# Patient Record
Sex: MALE | Race: BLACK OR AFRICAN AMERICAN | NOT HISPANIC OR LATINO | Employment: OTHER | URBAN - NONMETROPOLITAN AREA
[De-identification: names, ages, dates, MRNs, and addresses within clinical notes are randomized per-mention and may not be internally consistent; named-entity substitution may affect disease eponyms.]

---

## 2018-09-24 ENCOUNTER — OFFICE VISIT (OUTPATIENT)
Dept: ORTHOPEDIC SURGERY | Facility: CLINIC | Age: 73
End: 2018-09-24

## 2018-09-24 VITALS — WEIGHT: 206 LBS | BODY MASS INDEX: 28.84 KG/M2 | HEIGHT: 71 IN

## 2018-09-24 DIAGNOSIS — M25.512 CHRONIC LEFT SHOULDER PAIN: Primary | ICD-10-CM

## 2018-09-24 DIAGNOSIS — G89.29 CHRONIC LEFT SHOULDER PAIN: Primary | ICD-10-CM

## 2018-09-24 DIAGNOSIS — M75.122 COMPLETE TEAR OF LEFT ROTATOR CUFF: ICD-10-CM

## 2018-09-24 DIAGNOSIS — M25.512 LEFT SHOULDER PAIN, UNSPECIFIED CHRONICITY: Primary | ICD-10-CM

## 2018-09-24 PROCEDURE — 99203 OFFICE O/P NEW LOW 30 MIN: CPT | Performed by: ORTHOPAEDIC SURGERY

## 2018-09-24 RX ORDER — LATANOPROST 50 UG/ML
1 SOLUTION/ DROPS OPHTHALMIC NIGHTLY
COMMUNITY

## 2018-09-24 RX ORDER — LISINOPRIL 10 MG/1
40 TABLET ORAL 2 TIMES DAILY
COMMUNITY

## 2018-09-24 RX ORDER — SIMVASTATIN 10 MG
40 TABLET ORAL NIGHTLY
COMMUNITY

## 2018-09-24 RX ORDER — BUPIVACAINE HCL/0.9 % NACL/PF 0.1 %
2 PLASTIC BAG, INJECTION (ML) EPIDURAL ONCE
Status: CANCELLED | OUTPATIENT
Start: 2018-11-08 | End: 2018-11-08

## 2018-09-24 NOTE — PROGRESS NOTES
Tacos Lechuga is a 73 y.o. male   Primary provider:  Edgar Cardenas MD       Chief Complaint   Patient presents with   • Left Shoulder - Pain       HISTORY OF PRESENT ILLNESS: Patient is here today for left shoulder pain. Patient states that the pain has been ongoing for 7 years. He states his pain is 8/10.  He had a problem years ago that seemed to get better but is been going on now for the past 7 months and increasing in pain.  It's worse with activity better with rest it hurts when lying on it at night.  [facility] that revealed a full-thickness tear of his supraspinatus.  He is here for follow-up.    History of Present Illness     CONCURRENT MEDICAL HISTORY:    No past medical history on file.    No Known Allergies      Current Outpatient Prescriptions:   •  latanoprost (XALATAN) 0.005 % ophthalmic solution, 1 drop Every Night., Disp: , Rfl:   •  lisinopril (PRINIVIL,ZESTRIL) 10 MG tablet, Take 10 mg by mouth Daily., Disp: , Rfl:   •  simvastatin (ZOCOR) 10 MG tablet, Take 10 mg by mouth., Disp: , Rfl:     No past surgical history on file.    No family history on file.     Social History     Social History   • Marital status:      Spouse name: N/A   • Number of children: N/A   • Years of education: N/A     Occupational History   • Not on file.     Social History Main Topics   • Smoking status: Not on file   • Smokeless tobacco: Not on file   • Alcohol use Not on file   • Drug use: Unknown   • Sexual activity: Not on file     Other Topics Concern   • Not on file     Social History Narrative   • No narrative on file        Review of Systems   Constitutional: Positive for activity change. Negative for chills and fever.   HENT: Negative for facial swelling.    Eyes: Negative for photophobia.   Respiratory: Negative for apnea and shortness of breath.    Cardiovascular: Negative for chest pain and leg swelling.   Gastrointestinal: Negative for abdominal pain,  "nausea and vomiting.   Genitourinary: Negative for dysuria.   Musculoskeletal: Positive for arthralgias and myalgias.   Skin: Negative for color change and rash.   Neurological: Negative for seizures and syncope.   Psychiatric/Behavioral: Negative for behavioral problems and dysphoric mood.       PHYSICAL EXAMINATION:       Ht 180.3 cm (71\")   Wt 93.4 kg (206 lb)   BMI 28.73 kg/m²     Physical Exam   Constitutional: He is oriented to person, place, and time. He appears well-developed and well-nourished.   HENT:   Head: Normocephalic and atraumatic.   Eyes: Pupils are equal, round, and reactive to light. EOM are normal.   Neck: Neck supple. No tracheal deviation present.   Pulmonary/Chest: Effort normal.   Musculoskeletal: He exhibits tenderness. He exhibits no edema or deformity.   Neurological: He is alert and oriented to person, place, and time.   Skin: Skin is warm and dry. No erythema.   Psychiatric: He has a normal mood and affect.   Vitals reviewed.      GAIT:     [x]  Normal  []  Antalgic    Assistive device: [x]  None  []  Walker     []  Crutches  []  Cane     []  Wheelchair  []  Stretcher    Ortho Exam   Substances to external rotation and abduction of the left shoulder.  No tenderness over the AC joint.  Positive impingement with lateral pain resistance of the supraspinatus.  Weakness is noted here as well.  Neurovascularly intact distally.    No results found.  Trays and MRI reviewed x-rays show AC joint arthritis MRI scan showed an anterior full-thickness supraspinatus tear.      ASSESSMENT:    Diagnoses and all orders for this visit:    Chronic left shoulder pain    Complete tear of left rotator cuff    Other orders  -     simvastatin (ZOCOR) 10 MG tablet; Take 10 mg by mouth.  -     lisinopril (PRINIVIL,ZESTRIL) 10 MG tablet; Take 10 mg by mouth Daily.  -     latanoprost (XALATAN) 0.005 % ophthalmic solution; 1 drop Every Night.          PLAN I've gone over options with him.  He has a daily pain and " limiting his activities.  We'll plan on arthroscopic repair and subacromial decompression.  Risks and benefits of continued stiffness, need for prolonged therapy, recurrent tear, neurovascular injury, failure to resolve his pain, need for further surgery, neurovascular injury, medical anesthetic, locations, etc. this is all been discussed and understands we'll go and proceed as planned I've answered her questions today.    No Follow-up on file.    Renard Jacobson MD

## 2018-11-02 ENCOUNTER — APPOINTMENT (OUTPATIENT)
Dept: PREADMISSION TESTING | Facility: HOSPITAL | Age: 73
End: 2018-11-02

## 2018-11-02 VITALS
RESPIRATION RATE: 14 BRPM | HEART RATE: 73 BPM | DIASTOLIC BLOOD PRESSURE: 84 MMHG | BODY MASS INDEX: 29.12 KG/M2 | WEIGHT: 208 LBS | SYSTOLIC BLOOD PRESSURE: 142 MMHG | HEIGHT: 71 IN

## 2018-11-02 PROCEDURE — 93005 ELECTROCARDIOGRAM TRACING: CPT

## 2018-11-02 PROCEDURE — 93010 ELECTROCARDIOGRAM REPORT: CPT | Performed by: INTERNAL MEDICINE

## 2018-11-02 RX ORDER — SODIUM CHLORIDE, SODIUM GLUCONATE, SODIUM ACETATE, POTASSIUM CHLORIDE, AND MAGNESIUM CHLORIDE 526; 502; 368; 37; 30 MG/100ML; MG/100ML; MG/100ML; MG/100ML; MG/100ML
1000 INJECTION, SOLUTION INTRAVENOUS CONTINUOUS
Status: CANCELLED | OUTPATIENT
Start: 2018-11-08

## 2018-11-02 RX ORDER — CHOLECALCIFEROL (VITAMIN D3) 50 MCG
2000 TABLET ORAL DAILY
COMMUNITY

## 2018-11-02 NOTE — PAT
Called Dr. Tijerina read today EKG over the phone, patient denies chest pain and relates he can climb 2 flights of stairs without getting soa.  No further orders may proceed with surgery.    Chlorhexidine given with written and verbal instructions, all questions were answered to patient satisfaction.    Opioid pain medication pamphlet given.

## 2018-11-02 NOTE — DISCHARGE INSTRUCTIONS
[facility]  Pre-op Information and Guidelines    You will be called after 2 p.m. the day before your surgery (Friday for Monday surgery) and notified of your time for arrival and approximate surgery time.  If you have not received a call by 4P.M., please contact Same Day Surgery at [phone] of if outside [facility] call [phone].    Please Follow these Important Safety Guidelines:    • The morning of your procedure, take only the medications listed below with   A sip of water:_____________________________________________       ______________________________________________    • DO NOT eat or drink anything after 12:00 midnight the night before surgery  Specific instructions concerning drinking clear liquids will be discussed during  the pre-surgery instruction call the day before your surgery.    • If you take a blood thinner (ex. Plavix, Coumadin, aspirin), ask your doctor when to stop it before surgery  STOP DATE: _________________    • Only 2 visitors are allowed in patient rooms at a time  Your visitors will be asked to wait in the lobby until the admission process is complete with the exception of a parent with a child and patients in need of special assistance.    • YOU CANNOT DRIVE YOURSELF HOME  You must be accompanied by someone who will be responsible for driving you home after surgery and for your care at home.    • DO NOT chew gum, use breath mints, hard candy, or smoke the day of surgery  • DO NOT drink alcohol for at least 24 hours before your surgery  • DO NOT wear any jewelry and remove all body piercing before coming to the hospital  • DO NOT wear make-up to the hospital  • If you are having surgery on an extremity (arm/leg/foot) remove nail polish/artificial nails on the surgical side  • Clothing, glasses, contacts, dentures, and hairpieces must be removed before surgery  • Bathe the night before or the morning of your surgery and do not use powders/lotions on  skin.

## 2018-11-07 ENCOUNTER — ANESTHESIA EVENT (OUTPATIENT)
Dept: PERIOP | Facility: HOSPITAL | Age: 73
End: 2018-11-07

## 2018-11-08 ENCOUNTER — HOSPITAL ENCOUNTER (OUTPATIENT)
Facility: HOSPITAL | Age: 73
Setting detail: HOSPITAL OUTPATIENT SURGERY
Discharge: HOME | End: 2018-11-08
Attending: ORTHOPAEDIC SURGERY | Admitting: ORTHOPAEDIC SURGERY

## 2018-11-08 ENCOUNTER — ANESTHESIA (OUTPATIENT)
Dept: PERIOP | Facility: HOSPITAL | Age: 73
End: 2018-11-08

## 2018-11-08 VITALS
RESPIRATION RATE: 20 BRPM | HEART RATE: 60 BPM | DIASTOLIC BLOOD PRESSURE: 80 MMHG | OXYGEN SATURATION: 99 % | SYSTOLIC BLOOD PRESSURE: 143 MMHG | HEIGHT: 71 IN | WEIGHT: 205.25 LBS | BODY MASS INDEX: 28.73 KG/M2 | TEMPERATURE: 97.6 F

## 2018-11-08 DIAGNOSIS — M75.122 COMPLETE TEAR OF LEFT ROTATOR CUFF: ICD-10-CM

## 2018-11-08 PROCEDURE — 25010000002 PROPOFOL 10 MG/ML EMULSION: Performed by: NURSE ANESTHETIST, CERTIFIED REGISTERED

## 2018-11-08 PROCEDURE — 29826 SHO ARTHRS SRG DECOMPRESSION: CPT | Performed by: ORTHOPAEDIC SURGERY

## 2018-11-08 PROCEDURE — 25010000002 EPINEPHRINE PER 0.1 MG: Performed by: ORTHOPAEDIC SURGERY

## 2018-11-08 PROCEDURE — 25010000002 NEOSTIGMINE 4 MG/4ML SOLUTION PREFILLED SYRINGE: Performed by: NURSE ANESTHETIST, CERTIFIED REGISTERED

## 2018-11-08 PROCEDURE — 25010000002 FENTANYL CITRATE (PF) 100 MCG/2ML SOLUTION: Performed by: NURSE ANESTHETIST, CERTIFIED REGISTERED

## 2018-11-08 PROCEDURE — 29827 SHO ARTHRS SRG RT8TR CUF RPR: CPT | Performed by: ORTHOPAEDIC SURGERY

## 2018-11-08 PROCEDURE — 25010000002 MIDAZOLAM PER 1 MG: Performed by: NURSE ANESTHETIST, CERTIFIED REGISTERED

## 2018-11-08 PROCEDURE — 25010000002 PHENYLEPHRINE PER 1 ML: Performed by: NURSE ANESTHETIST, CERTIFIED REGISTERED

## 2018-11-08 PROCEDURE — 25010000002 ONDANSETRON PER 1 MG: Performed by: NURSE ANESTHETIST, CERTIFIED REGISTERED

## 2018-11-08 PROCEDURE — C1713 ANCHOR/SCREW BN/BN,TIS/BN: HCPCS | Performed by: ORTHOPAEDIC SURGERY

## 2018-11-08 DEVICE — SUT/ANCH BIOCOMP SWIVELOCK/C DBL 4.75X22MM: Type: IMPLANTABLE DEVICE | Site: SHOULDER | Status: FUNCTIONAL

## 2018-11-08 RX ORDER — NEOSTIGMINE METHYLSULFATE 4 MG/4 ML
SYRINGE (ML) INTRAVENOUS AS NEEDED
Status: DISCONTINUED | OUTPATIENT
Start: 2018-11-08 | End: 2018-11-08 | Stop reason: SURG

## 2018-11-08 RX ORDER — ONDANSETRON 2 MG/ML
INJECTION INTRAMUSCULAR; INTRAVENOUS AS NEEDED
Status: DISCONTINUED | OUTPATIENT
Start: 2018-11-08 | End: 2018-11-08 | Stop reason: SURG

## 2018-11-08 RX ORDER — OXYCODONE HYDROCHLORIDE AND ACETAMINOPHEN 5; 325 MG/1; MG/1
1-2 TABLET ORAL EVERY 4 HOURS PRN
Qty: 30 TABLET | Refills: 0 | Status: SHIPPED | OUTPATIENT
Start: 2018-11-08 | End: 2019-01-10

## 2018-11-08 RX ORDER — SODIUM CHLORIDE, SODIUM GLUCONATE, SODIUM ACETATE, POTASSIUM CHLORIDE, AND MAGNESIUM CHLORIDE 526; 502; 368; 37; 30 MG/100ML; MG/100ML; MG/100ML; MG/100ML; MG/100ML
1000 INJECTION, SOLUTION INTRAVENOUS CONTINUOUS
Status: DISCONTINUED | OUTPATIENT
Start: 2018-11-08 | End: 2018-11-08 | Stop reason: HOSPADM

## 2018-11-08 RX ORDER — ROCURONIUM BROMIDE 10 MG/ML
INJECTION, SOLUTION INTRAVENOUS AS NEEDED
Status: DISCONTINUED | OUTPATIENT
Start: 2018-11-08 | End: 2018-11-08 | Stop reason: SURG

## 2018-11-08 RX ORDER — ONDANSETRON 2 MG/ML
4 INJECTION INTRAMUSCULAR; INTRAVENOUS ONCE AS NEEDED
Status: DISCONTINUED | OUTPATIENT
Start: 2018-11-08 | End: 2018-11-08 | Stop reason: HOSPADM

## 2018-11-08 RX ORDER — MEPERIDINE HYDROCHLORIDE 50 MG/ML
12.5 INJECTION INTRAMUSCULAR; INTRAVENOUS; SUBCUTANEOUS
Status: DISCONTINUED | OUTPATIENT
Start: 2018-11-08 | End: 2018-11-08 | Stop reason: HOSPADM

## 2018-11-08 RX ORDER — LIDOCAINE HYDROCHLORIDE 20 MG/ML
INJECTION, SOLUTION INFILTRATION; PERINEURAL AS NEEDED
Status: DISCONTINUED | OUTPATIENT
Start: 2018-11-08 | End: 2018-11-08 | Stop reason: SURG

## 2018-11-08 RX ORDER — FENTANYL CITRATE 50 UG/ML
INJECTION, SOLUTION INTRAMUSCULAR; INTRAVENOUS AS NEEDED
Status: DISCONTINUED | OUTPATIENT
Start: 2018-11-08 | End: 2018-11-08 | Stop reason: SURG

## 2018-11-08 RX ORDER — EPHEDRINE SULFATE 50 MG/ML
INJECTION, SOLUTION INTRAVENOUS AS NEEDED
Status: DISCONTINUED | OUTPATIENT
Start: 2018-11-08 | End: 2018-11-08 | Stop reason: SURG

## 2018-11-08 RX ORDER — ALBUTEROL SULFATE 2.5 MG/3ML
2.5 SOLUTION RESPIRATORY (INHALATION) ONCE AS NEEDED
Status: DISCONTINUED | OUTPATIENT
Start: 2018-11-08 | End: 2018-11-08 | Stop reason: HOSPADM

## 2018-11-08 RX ORDER — BUPIVACAINE HCL/0.9 % NACL/PF 0.1 %
2 PLASTIC BAG, INJECTION (ML) EPIDURAL ONCE
Status: COMPLETED | OUTPATIENT
Start: 2018-11-08 | End: 2018-11-08

## 2018-11-08 RX ORDER — GLYCOPYRROLATE 0.2 MG/ML
INJECTION INTRAMUSCULAR; INTRAVENOUS AS NEEDED
Status: DISCONTINUED | OUTPATIENT
Start: 2018-11-08 | End: 2018-11-08 | Stop reason: SURG

## 2018-11-08 RX ORDER — MIDAZOLAM HYDROCHLORIDE 1 MG/ML
INJECTION INTRAMUSCULAR; INTRAVENOUS AS NEEDED
Status: DISCONTINUED | OUTPATIENT
Start: 2018-11-08 | End: 2018-11-08 | Stop reason: SURG

## 2018-11-08 RX ORDER — PROPOFOL 10 MG/ML
VIAL (ML) INTRAVENOUS AS NEEDED
Status: DISCONTINUED | OUTPATIENT
Start: 2018-11-08 | End: 2018-11-08 | Stop reason: SURG

## 2018-11-08 RX ORDER — EPINEPHRINE 1 MG/ML
INJECTION, SOLUTION, CONCENTRATE INTRAVENOUS AS NEEDED
Status: DISCONTINUED | OUTPATIENT
Start: 2018-11-08 | End: 2018-11-08 | Stop reason: HOSPADM

## 2018-11-08 RX ADMIN — PHENYLEPHRINE HYDROCHLORIDE 50 MCG: 10 INJECTION INTRAVENOUS at 12:58

## 2018-11-08 RX ADMIN — ROCURONIUM BROMIDE 35 MG: 10 INJECTION INTRAVENOUS at 12:36

## 2018-11-08 RX ADMIN — ONDANSETRON 4 MG: 2 INJECTION INTRAMUSCULAR; INTRAVENOUS at 14:04

## 2018-11-08 RX ADMIN — SODIUM CHLORIDE, SODIUM GLUCONATE, SODIUM ACETATE, POTASSIUM CHLORIDE, AND MAGNESIUM CHLORIDE: 526; 502; 368; 37; 30 INJECTION, SOLUTION INTRAVENOUS at 13:00

## 2018-11-08 RX ADMIN — LIDOCAINE HYDROCHLORIDE 50 MG: 20 INJECTION, SOLUTION INFILTRATION; PERINEURAL at 12:36

## 2018-11-08 RX ADMIN — SODIUM CHLORIDE, SODIUM GLUCONATE, SODIUM ACETATE, POTASSIUM CHLORIDE, AND MAGNESIUM CHLORIDE 1000 ML: 526; 502; 368; 37; 30 INJECTION, SOLUTION INTRAVENOUS at 10:21

## 2018-11-08 RX ADMIN — PHENYLEPHRINE HYDROCHLORIDE 50 MCG: 10 INJECTION INTRAVENOUS at 13:04

## 2018-11-08 RX ADMIN — PROPOFOL 50 MG: 10 INJECTION, EMULSION INTRAVENOUS at 14:01

## 2018-11-08 RX ADMIN — EPHEDRINE SULFATE 10 MG: 50 INJECTION INTRAVENOUS at 13:07

## 2018-11-08 RX ADMIN — EPHEDRINE SULFATE 10 MG: 50 INJECTION INTRAVENOUS at 13:11

## 2018-11-08 RX ADMIN — Medication 2 G: at 12:44

## 2018-11-08 RX ADMIN — PROPOFOL 100 MG: 10 INJECTION, EMULSION INTRAVENOUS at 12:36

## 2018-11-08 RX ADMIN — MIDAZOLAM HYDROCHLORIDE 2 MG: 2 INJECTION, SOLUTION INTRAMUSCULAR; INTRAVENOUS at 12:14

## 2018-11-08 RX ADMIN — SODIUM CHLORIDE, SODIUM GLUCONATE, SODIUM ACETATE, POTASSIUM CHLORIDE, AND MAGNESIUM CHLORIDE: 526; 502; 368; 37; 30 INJECTION, SOLUTION INTRAVENOUS at 13:01

## 2018-11-08 RX ADMIN — GLYCOPYRROLATE 0.4 MCG: 0.2 INJECTION, SOLUTION INTRAMUSCULAR; INTRAVENOUS at 14:18

## 2018-11-08 RX ADMIN — FENTANYL CITRATE 50 MCG: 50 INJECTION, SOLUTION INTRAMUSCULAR; INTRAVENOUS at 14:01

## 2018-11-08 RX ADMIN — Medication 2 MG: at 14:19

## 2018-11-08 RX ADMIN — FENTANYL CITRATE 50 MCG: 50 INJECTION, SOLUTION INTRAMUSCULAR; INTRAVENOUS at 12:36

## 2018-11-08 NOTE — ANESTHESIA PROCEDURE NOTES
Airway  Urgency: elective    Date/Time: 11/8/2018 12:46 PM  End Time:11/8/2018 12:47 PM    General Information and Staff    Patient location during procedure: OR    Indications and Patient Condition  Indications for airway management: airway protection    Preoxygenated: yes  MILS not maintained throughout  Mask difficulty assessment: 1 - vent by mask    Final Airway Details  Final airway type: endotracheal airway      Successful airway: ETT  Cuffed: yes   Successful intubation technique: direct laryngoscopy  Facilitating devices/methods: intubating stylet  Endotracheal tube insertion site: oral  Blade: Arin  Blade size: 3  ETT size: 7.5 mm  Cormack-Lehane Classification: grade I - full view of glottis  Placement verified by: chest auscultation and capnometry   Measured from: lips  ETT to lips (cm): 22

## 2018-11-08 NOTE — H&P
Patient Care Team:  Edgar Cardenas MD as PCP - General (Family Medicine)  Renard Jacobson MD as Consulting Physician (Orthopedic Surgery)    Chief complaint shoulder pain    Subjective     73-year-old with shoulder pain been going on for years.  He had a MRI to be a short anterior supraspinatus tear was full-thickness see if her cuff repair at this point this is an interval H&P since we have seen him greater than 30 days ago.        Review of Systems   Constitutional: Positive for activity change. Negative for chills and fever.   HENT: Negative for facial swelling.    Eyes: Negative for photophobia.   Respiratory: Negative for apnea and shortness of breath.    Cardiovascular: Negative for chest pain and leg swelling.   Gastrointestinal: Negative for abdominal pain, nausea and vomiting.   Genitourinary: Negative for dysuria.   Musculoskeletal: Positive for arthralgias and joint swelling.   Skin: Negative for color change and rash.   Neurological: Negative for seizures and syncope.   Psychiatric/Behavioral: Negative for behavioral problems and dysphoric mood.        Past Medical History:   Diagnosis Date   • Arthritis    • Elevated cholesterol    • Hypertension    • Left hip pain    • Pain in shoulder     both shoulders   • Wears glasses    • Wears partial dentures        Objective      Vital Signs  Temp:  [97.7 °F (36.5 °C)] 97.7 °F (36.5 °C)  Heart Rate:  [81] 81  Resp:  [18] 18  BP: (141)/(75) 141/75  No Known Allergies   Past Surgical History:   Procedure Laterality Date   • FINGER SURGERY Right     middle   • FRACTURE SURGERY      left arm     No current facility-administered medications on file prior to encounter.      Current Outpatient Prescriptions on File Prior to Encounter   Medication Sig Dispense Refill   • latanoprost (XALATAN) 0.005 % ophthalmic solution Administer 1 drop to both eyes Every Night.     • lisinopril (PRINIVIL,ZESTRIL) 10 MG tablet Take 40 mg by mouth 2 (Two) Times a Day.      • simvastatin (ZOCOR) 10 MG tablet Take 40 mg by mouth Every Night. Takes 0.5 tablet       Social History     Social History   • Marital status:      Spouse name: N/A   • Number of children: N/A   • Years of education: N/A     Occupational History   • Not on file.     Social History Main Topics   • Smoking status: Former Smoker   • Smokeless tobacco: Never Used   • Alcohol use Yes      Comment: yearly   • Drug use: No   • Sexual activity: Not on file     Other Topics Concern   • Not on file     Social History Narrative   • No narrative on file     History reviewed. No pertinent family history.    Physical Exam   Constitutional: He is oriented to person, place, and time. He appears well-developed and well-nourished.   HENT:   Head: Normocephalic and atraumatic.   Eyes: Pupils are equal, round, and reactive to light. EOM are normal.   Neck: Neck supple. No tracheal deviation present.   Pulmonary/Chest: Effort normal.   Musculoskeletal: He exhibits tenderness. He exhibits no edema or deformity.   Neurological: He is alert and oriented to person, place, and time.   Skin: Skin is warm and dry. No erythema.   Psychiatric: He has a normal mood and affect.   Vitals reviewed.      Results Review:   I reviewed the patient's new clinical results.      Assessment/Plan       Complete tear of left rotator cuff      Assessment & Plan    I discussed the patients findings and my recommendations with patient    Renard Jacobson MD  11/08/18  11:55 AM    Time: More than 50% of time spent in counseling and coordination of care:  Total face-to-face/floor time 20 min.  Time spent in counseling 20 min. Counseling included the following topics: Benefits explained to him again including but not limited to bleeding, blood clot, need for further surgery, failure to resolve his pain, recurrence of tear, infection, medical anesthetic complications, etc. he understands detailed informed consent is given we will proceed as planned

## 2018-11-08 NOTE — ANESTHESIA PREPROCEDURE EVALUATION
Anesthesia Evaluation     Patient summary reviewed and Nursing notes reviewed   no history of anesthetic complications:  NPO Solid Status: > 8 hours  NPO Liquid Status: > 8 hours           Airway   Mallampati: II  TM distance: >3 FB  Neck ROM: full  possible difficult intubation  Dental    (+) poor dentation    Pulmonary - normal exam    breath sounds clear to auscultation  (+) a smoker Former,   Cardiovascular - normal exam    ECG reviewed  Rhythm: regular  Rate: normal    (+) hypertension, hyperlipidemia,   (-) murmur, carotid bruits    ROS comment: Normal sinus rhythm  Right bundle branch block  Abnormal ECG  No previous ECGs available  Confirmed by DANAE CHUA MD ([id number]) on 11/3/2018 1:40:19 PM    Neuro/Psych- negative ROS  GI/Hepatic/Renal/Endo - negative ROS     Musculoskeletal     Abdominal    Substance History - negative use     OB/GYN negative ob/gyn ROS         Other   (+) arthritis                     Anesthesia Plan    ASA 3     general   (Discussed peripheral nerve block(interscalene) for post op pain relief and patient understands possible complications,risks and agrees.)  intravenous induction   Anesthetic plan, all risks, benefits, and alternatives have been provided, discussed and informed consent has been obtained with: patient and spouse/significant other.

## 2018-11-08 NOTE — ANESTHESIA PROCEDURE NOTES
Peripheral Block      Start time: 11/8/2018 12:20 PM  Stop time: 11/8/2018 12:30 PM  Performed by  CRNA: ÁLVARO ALVARENGA  Assisted by: PANTERA PARKER  Preanesthetic Checklist  Completed: patient identified, site marked, surgical consent, pre-op evaluation, timeout performed, IV checked, risks and benefits discussed and monitors and equipment checked  Prep:  Pt Position: sitting  Sterile barriers:cap, gloves, gown and mask  Prep: ChloraPrep  Patient monitoring: blood pressure monitoring, continuous pulse oximetry and EKG  Procedure  Sedation:yes  Performed under: MAC  Guidance:ultrasound guided and landmark technique  Images:still images obtained    Laterality:left  Block Type:interscalene  Injection Technique:single-shot  Needle Type:echogenic and short-bevel  Needle Gauge:21 G    Medications  Comment:1cc 10mg Decadron  Local Injected:ropivacaine 0.5% Local Amount Injected:29mL  Post Assessment  Injection Assessment: negative aspiration for heme, no paresthesia on injection and incremental injection  Patient Tolerance:comfortable throughout block  Complications:no

## 2018-11-08 NOTE — ANESTHESIA POSTPROCEDURE EVALUATION
Patient: Tacos Ha Ankush    Procedure Summary     Date:  11/08/18 Room / Location:  [facility] OR [address] [place] [address] OR    Anesthesia Start:  1215 Anesthesia Stop:  1437    Procedure:  ARTHROSCOPIC SUBACROMIAL DECOMPRESSION  WITH ROTATOR CUFF REPAIR (Left Shoulder) Diagnosis:       Complete tear of left rotator cuff      (Complete tear of left rotator cuff [M75.122])    Surgeon:  Renard Jacobson MD Provider:  Angel Tijerina MD    Anesthesia Type:  general ASA Status:  3          Anesthesia Type: general  Last vitals  BP   141/75 (11/08/18 1013)   Temp   97.7 °F (36.5 °C) (11/08/18 1013)   Pulse   81 (11/08/18 1013)   Resp   18 (11/08/18 1013)     SpO2   97 % (11/08/18 1013)     Post Anesthesia Care and Evaluation    Patient location during evaluation: bedside  Patient participation: complete - patient participated  Level of consciousness: awake  Pain score: 0  Pain management: adequate  Airway patency: patent  Anesthetic complications: No anesthetic complications  PONV Status: none  Cardiovascular status: acceptable  Respiratory status: acceptable  Hydration status: acceptable

## 2018-11-09 NOTE — OP NOTE
Procedure(s):  ARTHROSCOPIC SUBACROMIAL DECOMPRESSION  WITH ROTATOR CUFF REPAIR  Procedure Note    Tacos Ha Ankush  11/8/2018    Pre-op Diagnosis:   Complete tear of left rotator cuff [M75.122]    Post-op Diagnosis:     Post-Op Diagnosis Codes:     * Complete tear of left rotator cuff [M75.122]    Procedure/CPT® Codes:      Procedure(s):  1.ARTHROSCOPIC SUBACROMIAL DECOMPRESSION LEFT  2. ARTHROSCOPIC ROTATOR CUFF REPAIR LEFT  3.  Manipulation under anesthesia left shoulder  Surgeon(s):  Renard Jacobson MD    Anesthesia: General with Block    Staff:   Circulator: Raheem Caraballo RN; Gwendolyn Jacobs RN  Scrub Person: Justa Angelo; Jordyn Vigil CST  Assistant: Carlita Ceja MA  Other: Vikas Chaves RN    Estimated Blood Loss: minimal    Specimens:                None      Drains:      Findings: Rotator cuff tear/adhesive capsulitis    Complications: None    Dictation: Indications:73-year-old gentleman with radiographic and clinical evidence of rotator cuff tear who has failed conservative treatment including injections, therapy, activity modifications, medications, etc.  He has persistent pain he is rotator cuff repair and surgery is indicated today.  Risk and benefits of stiffness, need for further surgery, recurrence, neurovascular injury, infection, need for medical anesthetic complications, etc. this is all been discussed he understands we will proceed as planned.    Procedure: After adequate general anesthesia obtained patient's shoulder prepped and draped usual sterile fashion , placed in beachchair position, timeout was performed.    The shoulder was examined and noted to be stiff and abduction external rotation and manipulation was performed which he restored his motion.    Posterior portal was encountered the glenohumeral joint was inspected certainly was very tight marked inflammation was noted in the shoulder joint.  The glenohumeral joint itself looked intact labrum was  intact the biceps tendon was intact marked inflammatory synovitis was noted.  Pictures were taken of this joint and kept for the record's were used to the entire case.  The cuff was noted have a small internal tear at the anterior supraspinatus again biceps was intact labrum was intact.  No loose bodies noted.    Attention was then paid to subacromial area thickened bursa was resected and 8 and visualization the ligamentous structures were taken off the anterior acromion for a fairly significant spur was encountered.  He did not have preoperative AC joint tenderness therefore it was not resected.  A bur was used to perform acromioplasty creating a type I acromion from a type I-type II using the bur a cutting block technique to ensure we had completed adequate decompression.  At this point a tear was encountered fairly thin tissue as expected this was likely just over a centimeter.  The edges of the cuff were torn were shaved back to good healthy tissue bur was use to create a good bleeding bony footprint.  This was just adjacent to the biceps tendon.    The arthroscopic repair take then placed in a mattress fashion and a suture anchors placed laterally in the greater tuberosity through small hole.  The cuff is tightened down and closed over the bleeding bony footprint and pictures were taken of this as well the anchor was deployed and secured excess sutures removed.  Pictures were taken of this satisfied and happy with our repair.  There is irrigated) saline as was withdrawn portals closed with 4-0 nylon suture soled immobilizer applied he tolerated the procedure well.    Renard Jacobson MD  11/08/18  8:29 PM

## 2018-11-15 ENCOUNTER — OFFICE VISIT (OUTPATIENT)
Dept: ORTHOPEDIC SURGERY | Facility: CLINIC | Age: 73
End: 2018-11-15

## 2018-11-15 VITALS — BODY MASS INDEX: 28.7 KG/M2 | HEIGHT: 71 IN | WEIGHT: 205 LBS

## 2018-11-15 DIAGNOSIS — M75.122 COMPLETE TEAR OF LEFT ROTATOR CUFF: Primary | ICD-10-CM

## 2018-11-15 PROCEDURE — 99024 POSTOP FOLLOW-UP VISIT: CPT | Performed by: ORTHOPAEDIC SURGERY

## 2018-11-15 NOTE — PROGRESS NOTES
Tacos Lechuga is a 73 y.o. male is s/p       Chief Complaint   Patient presents with   • Post-op     Left shoulder       HISTORY OF PRESENT ILLNESS: Patient is here today s/p arthroscopic SAD with rotator cuff repair of the left shoulder. Patient states that he is not having any pain today.        No Known Allergies      Current Outpatient Medications:   •  Cholecalciferol (VITAMIN D) 2000 units tablet, Take 2,000 Units by mouth Daily., Disp: , Rfl:   •  latanoprost (XALATAN) 0.005 % ophthalmic solution, Administer 1 drop to both eyes Every Night., Disp: , Rfl:   •  lisinopril (PRINIVIL,ZESTRIL) 10 MG tablet, Take 40 mg by mouth 2 (Two) Times a Day., Disp: , Rfl:   •  Multiple Vitamins-Minerals (MULTIVITAMIN ADULT PO), Take 1 tablet by mouth Daily., Disp: , Rfl:   •  oxyCODONE-acetaminophen (PERCOCET) 5-325 MG per tablet, Take 1-2 tablets by mouth Every 4 (Four) Hours As Needed (Pain)., Disp: 30 tablet, Rfl: 0  •  simvastatin (ZOCOR) 10 MG tablet, Take 40 mg by mouth Every Night. Takes 0.5 tablet, Disp: , Rfl:     No fevers or chills.  No nausea or vomiting.      PHYSICAL EXAMINATION:       Tacos Lechuga is a 73 y.o. male    Patient is awake and alert, answers questions appropriately and is in no apparent distress.    GAIT:     []  Normal  []  Antalgic    Assistive device: []  None  []  Walker     []  Crutches  []  Cane     []  Wheelchair  []  Stretcher    Ortho Exam  Wounds look good.  No sign of infection.  Neurovascularly intact.  He is moving his arm probably little more than he should.    No results found.        ASSESSMENT:    Diagnoses and all orders for this visit:    Complete tear of left rotator cuff  -     Ambulatory Referral to Physical Therapy          PLAN doing very well at this point.  Suture removal.  I've gone over his arthroscopic pictures with him and the importance of passive range of motion.  He did have some stiffness, adhesive capsulitis and surgery will start therapy check  him in 3 weeks.  Call sooner with any problems.  He is to continue his sling    No Follow-up on file.    Renard Jacobson MD

## 2018-12-06 ENCOUNTER — HOSPITAL ENCOUNTER (OUTPATIENT)
Dept: PHYSICAL THERAPY | Facility: HOSPITAL | Age: 73
Setting detail: THERAPIES SERIES
Discharge: HOME | End: 2018-12-06

## 2018-12-06 ENCOUNTER — OFFICE VISIT (OUTPATIENT)
Dept: ORTHOPEDIC SURGERY | Facility: CLINIC | Age: 73
End: 2018-12-06

## 2018-12-06 VITALS — BODY MASS INDEX: 30.1 KG/M2 | HEIGHT: 71 IN | WEIGHT: 215 LBS

## 2018-12-06 DIAGNOSIS — M25.512 LEFT SHOULDER PAIN, UNSPECIFIED CHRONICITY: ICD-10-CM

## 2018-12-06 DIAGNOSIS — M25.512 ACUTE PAIN OF LEFT SHOULDER: Primary | ICD-10-CM

## 2018-12-06 DIAGNOSIS — G89.29 CHRONIC LEFT SHOULDER PAIN: ICD-10-CM

## 2018-12-06 DIAGNOSIS — M25.512 CHRONIC LEFT SHOULDER PAIN: ICD-10-CM

## 2018-12-06 DIAGNOSIS — M75.122 COMPLETE TEAR OF LEFT ROTATOR CUFF: ICD-10-CM

## 2018-12-06 DIAGNOSIS — M75.122 COMPLETE TEAR OF LEFT ROTATOR CUFF: Primary | ICD-10-CM

## 2018-12-06 PROCEDURE — G8985 CARRY GOAL STATUS: HCPCS | Performed by: PHYSICAL THERAPIST

## 2018-12-06 PROCEDURE — G8984 CARRY CURRENT STATUS: HCPCS | Performed by: PHYSICAL THERAPIST

## 2018-12-06 PROCEDURE — 97110 THERAPEUTIC EXERCISES: CPT | Performed by: PHYSICAL THERAPIST

## 2018-12-06 PROCEDURE — 99024 POSTOP FOLLOW-UP VISIT: CPT | Performed by: ORTHOPAEDIC SURGERY

## 2018-12-06 PROCEDURE — 97161 PT EVAL LOW COMPLEX 20 MIN: CPT | Performed by: PHYSICAL THERAPIST

## 2018-12-06 NOTE — PROGRESS NOTES
"Tacos Lechuga is a 73 y.o. male returns for     Chief Complaint   Patient presents with   • Left Shoulder - Post-op     11/8/18  Surgeon Role   Renard Jacobson MD Primary    Procedure Laterality Anesthesia   ARTHROSCOPIC SUBACROMIAL DECOMPRESSION  WITH ROTATOR CUFF REPAIR Left General with Block          HISTORY OF PRESENT ILLNESS: Patient being seen for left shoulder follow up.  Is been 3 weeks since we've seen him he has not had physical therapy yet although his wife has been trying very hard to get this arranged.       CONCURRENT MEDICAL HISTORY:    The following portions of the patient's history were reviewed and updated as appropriate: allergies, current medications, past family history, past medical history, past social history, past surgical history and problem list.     ROS  No fevers or chills.  No chest pain or shortness of air.  No GI or  disturbances.    PHYSICAL EXAMINATION:       Ht 180.3 cm (71\")   Wt 97.5 kg (215 lb)   BMI 29.99 kg/m²     Physical Exam    GAIT:     [x]  Normal  []  Antalgic    Assistive device: [x]  None  []  Walker     []  Crutches  []  Cane     []  Wheelchair  []  Stretcher    Ortho Exam  Wounds look good.  Stiff in all parameters probably 20° of external rotation 50° of abduction 70° of forward flexion and he is neurovascularly intact               ASSESSMENT:    Diagnoses and all orders for this visit:    Complete tear of left rotator cuff    Left shoulder pain, unspecified chronicity    Chronic left shoulder pain          PLAN I discussed this with the preserve person as well as my nurse as well as the patient and his wife.  We put in order at his visit 3 weeks ago.  Apparently this is gone to [place] and is been problematic on that aspect of getting anything approved.  I spent an additional 20 minutes today going over exercises with pulleys and I provided the patient extraoral rotation and abduction stretching exercises.  Clearly he is stiff at this point " this is not ideal.  I have reassured him that I think one with another we'll get this back but clearly this is added on time to his recovery.  In my hands everybody that has a rotator cuff repair needs physical therapy.  Especially in light of the head and adhesive capsulitis.  I would think this would be standard of care and should be approved the same time the surgery is approved.  We have made multiple calls today as has R precert person to get this approved and have given a good set of home exercises meantime.  I will see him in 2 weeks will come out of the sling and start active motion at that time    No Follow-up on file.    Siomara Hogue MA

## 2018-12-10 ENCOUNTER — HOSPITAL ENCOUNTER (OUTPATIENT)
Dept: PHYSICAL THERAPY | Facility: HOSPITAL | Age: 73
Setting detail: THERAPIES SERIES
Discharge: HOME | End: 2018-12-10

## 2018-12-10 DIAGNOSIS — M75.122 COMPLETE TEAR OF LEFT ROTATOR CUFF: ICD-10-CM

## 2018-12-10 DIAGNOSIS — M25.512 ACUTE PAIN OF LEFT SHOULDER: Primary | ICD-10-CM

## 2018-12-10 PROCEDURE — 97110 THERAPEUTIC EXERCISES: CPT | Performed by: PHYSICAL THERAPIST

## 2018-12-12 ENCOUNTER — HOSPITAL ENCOUNTER (OUTPATIENT)
Dept: PHYSICAL THERAPY | Facility: HOSPITAL | Age: 73
Setting detail: THERAPIES SERIES
Discharge: HOME | End: 2018-12-12

## 2018-12-12 DIAGNOSIS — M75.122 COMPLETE TEAR OF LEFT ROTATOR CUFF: ICD-10-CM

## 2018-12-12 DIAGNOSIS — M25.512 ACUTE PAIN OF LEFT SHOULDER: Primary | ICD-10-CM

## 2018-12-12 PROCEDURE — 97110 THERAPEUTIC EXERCISES: CPT

## 2018-12-12 NOTE — THERAPY TREATMENT NOTE
Outpatient Physical Therapy Ortho Treatment Note  [facility]  Cecille Hawthorne PTA       Patient Name: Tacos Lechuga  : 1945  MRN: [id number]  Today's Date: 2018      Visit Date: 2018     Visits: 3/3  Insurance Visits Approved: [email or web address]  Recert Due: 2018  MD Appt: 18  Pain: pretreatment 0/10; post treatment 0/10  Improvement: pt is subjectively reporting 0% improvement since initial evaluation    Visit Dx:    ICD-10-CM ICD-9-CM   1. Acute pain of left shoulder M25.512 719.41   2. Complete tear of left rotator cuff M75.122 727.61       Patient Active Problem List   Diagnosis   • Left shoulder pain   • Complete tear of left rotator cuff        Past Medical History:   Diagnosis Date   • Arthritis    • Elevated cholesterol    • Hypertension    • Left hip pain    • Pain in shoulder     both shoulders   • Wears glasses    • Wears partial dentures         Past Surgical History:   Procedure Laterality Date   • COLONOSCOPY     • FINGER SURGERY Right     middle   • FRACTURE SURGERY      left arm       PT Ortho     Row Name 18 1100       Left Upper Ext    Lt Shoulder Abduction PROM  144°  -    Lt Shoulder Flexion PROM  140°  -    Row Name 18 1000       Subjective Comments    Subjective Comments  states that he isn't having any pain. questions what would happen if he is activtly moving his arm  -       Precautions and Contraindications    Precautions  PROM ONLY PER MD  -       Subjective Pain    Able to rate subjective pain?  yes  -    Pre-Treatment Pain Level  0  -    Post-Treatment Pain Level  0  -    Row Name 12/10/18 0800       Subjective Comments    Subjective Comments  Patient reports no pain.  -       Precautions and Contraindications    Precautions/Limitations  shoulder precautions  -    Precautions  PROM ONLY PER MD  (Significant)   -       Subjective Pain    Able to rate subjective pain?  yes  -    Pre-Treatment Pain Level   0  -AJ    Post-Treatment Pain Level  0  -AJ       General ROM    LT Upper Ext  Lt Shoulder ABduction;Lt Shoulder Flexion;Lt Shoulder External Rotation;Lt Shoulder Internal Rotation  -AJ       Left Upper Ext    Lt Shoulder Abduction PROM  99°  -AJ    Lt Shoulder Flexion PROM  132°  -AJ    Lt Shoulder External Rotation PROM  44° in scapular plane  -AJ    Lt Shoulder Internal Rotation PROM  35° in scapular plane  -AJ      User Key  (r) = Recorded By, (t) = Taken By, (c) = Cosigned By    Initials Name Provider Type     Cecille Hawthorne PTA Physical Therapy Assistant    Lana Dimas, PT Physical Therapist                      PT Assessment/Plan     Row Name 12/12/18 1000          PT Assessment    Assessment Comments  patient is very guarded with PROM this treatment but does improve with distraction iwth AROM of conralateral side. good effort throughout and has improved PROM today. pt eludes to the fact that he may be actively moving his arm some at home and wants to know what the implications are of that. patient verbalizes understanding today of need to discontinue AROM currently  -        PT Plan    PT Frequency  3x/week  -     PT Plan Comments  continue with PROM until MD clears for progression  -       User Key  (r) = Recorded By, (t) = Taken By, (c) = Cosigned By    Initials Name Provider Type     Cecille Hawthorne PTA Physical Therapy Assistant          Modalities     Row Name 12/12/18 1000             Ice    Ice Applied  Yes  -      Location  L shoulder   -      Rx Minutes  15 mins  -      Ice S/P Rx  Yes  -        User Key  (r) = Recorded By, (t) = Taken By, (c) = Cosigned By    Initials Name Provider Type     Cecille Hawthorne PTA Physical Therapy Assistant          Exercises     Row Name 12/12/18 1000             Subjective Comments    Subjective Comments  states that he isn't having any pain. questions what would happen if he is activtly moving his arm  -         Subjective Pain     Able to rate subjective pain?  yes  -      Pre-Treatment Pain Level  0  -      Post-Treatment Pain Level  0  -         Exercise 1    Exercise Name 1  pendulums 6 way  -      Time 1  3 minutes each  -         Exercise 2    Exercise Name 2  AROM elbow flex/ext  -      Reps 2  20  -         Exercise 3    Exercise Name 3  AROM Forarm Pro/Supination  -      Reps 3  20  -         Exercise 4    Exercise Name 4  Table Slides 3 way  -      Time 4  3 minutes each  -         Exercise 5    Exercise Name 5  Scap Squeezes  -      Reps 5  20  -      Time 5  5 sec hold  -         Exercise 6    Exercise Name 6  PROM all planes  -      Time 6  20 minutes  -      Additional Comments  use of pt distraction with AROM of contralateral arm in same motion L arm is being passively moved.   -        User Key  (r) = Recorded By, (t) = Taken By, (c) = Cosigned By    Initials Name Provider Type    Cecille Ross, PTA Physical Therapy Assistant                         PT OP Goals     Row Name 12/12/18 1000          PT Short Term Goals    STG Date to Achieve  12/20/18  -     STG 1  Pt indep with HEP for L shoulder ROM/strengthening  -     STG 1 Progress  Ongoing  [facility]     STG 2  Improve L shoulder flex AAROM to 110°  -     STG 2 Progress  [facility]     STG 3  Improve L shoulder abduction AAROM to 90°  -     STG 3 Progress  Ongoing  [facility]     STG 4  Improve L shoulder ER AAROM to 40°  -     STG 4 Progress  [facility]     STG 5  Improve L shoulder MMT to 3/5  -     STG 5 Progress  [facility]        Long Term Goals    LTG Date to Achieve  01/03/19  -     LTG 1  Reduce L shoulder pain to 0-1/10 level (at worst) performing ADL's  -     LTG 1 Progress  Ongoing  [facility]     LTG 2  Improve L shoulder flex and abduction AROM to 150° and 110°  -     LTG 2 Progress  [facility]     LTG 3  Improve L shoulder ER AROM to 60°  -     LTG 3 Progress  [facility]     LTG 4  Improve L shoulder MMT to 4-/5   -     LTG 4 Progress  Ongoing  -     LTG 5  Reduce Quick Dash score to 40 or less  -     LTG 5 Progress  Ongoing  -MH        Time Calculation    PT Goal Re-Cert Due Date  12/27/18  -       User Key  (r) = Recorded By, (t) = Taken By, (c) = Cosigned By    Initials Name Provider Type     Cecille Hawthorne PTA Physical Therapy Assistant          Therapy Education  Education Details: reinforced no AROM currently  Given: HEP, Symptoms/condition management, Pain management  Program: Reinforced  How Provided: Verbal, Demonstration  Provided to: Patient  Level of Understanding: Verbalized, Demonstrated              Time Calculation:   Start Time: 1020  Stop Time: 1130  Time Calculation (min): 70 min  PT Non-Billable Time (min): 15 min  Total Timed Code Minutes- PT: 55 minute(s)    Therapy Charges for Today     Code Description Service Date Service Provider Modifiers Qty    36690038816 HC PT THER PROC EA 15 MIN 12/12/2018 Cecille Hawthorne PTA GP 4    55068446671 HC PT THER SUPP EA 15 MIN 12/12/2018 Cecille Hawthorne PTA GP 1                    Cecille Hawthorne PTA  12/12/2018

## 2018-12-13 ENCOUNTER — HOSPITAL ENCOUNTER (OUTPATIENT)
Dept: PHYSICAL THERAPY | Facility: HOSPITAL | Age: 73
Setting detail: THERAPIES SERIES
Discharge: HOME | End: 2018-12-13

## 2018-12-13 DIAGNOSIS — M75.122 COMPLETE TEAR OF LEFT ROTATOR CUFF: ICD-10-CM

## 2018-12-13 DIAGNOSIS — M25.512 ACUTE PAIN OF LEFT SHOULDER: Primary | ICD-10-CM

## 2018-12-13 PROCEDURE — 97140 MANUAL THERAPY 1/> REGIONS: CPT | Performed by: PHYSICAL THERAPIST

## 2018-12-13 PROCEDURE — 97110 THERAPEUTIC EXERCISES: CPT | Performed by: PHYSICAL THERAPIST

## 2018-12-13 NOTE — THERAPY TREATMENT NOTE
"    Outpatient Physical Therapy Ortho Treatment Note  [facility]  Lana Pearson, PT, DPT, CSCS       Patient Name: Tacos Ha Ankush  : 1945  MRN: [id number]  Today's Date: 2018      Visit Date: 2018     Pt reports \"sore\"/10 pain pre treatment, 4/10 pain post treatment  Reports 0% of improvement.  Attended 4/4 visits.  Insurance available: [employer]  Next MD appt: 2018.  Recertification: 2018.    Visit Dx:    ICD-10-CM ICD-9-CM   1. Acute pain of left shoulder M25.512 719.41   2. Complete tear of left rotator cuff M75.122 727.61       Patient Active Problem List   Diagnosis   • Left shoulder pain   • Complete tear of left rotator cuff        Past Medical History:   Diagnosis Date   • Arthritis    • Elevated cholesterol    • Hypertension    • Left hip pain    • Pain in shoulder     both shoulders   • Wears glasses    • Wears partial dentures         Past Surgical History:   Procedure Laterality Date   • COLONOSCOPY     • FINGER SURGERY Right     middle   • FRACTURE SURGERY      left arm       PT Ortho     Row Name 18 0900       Subjective Comments    Subjective Comments  Patient reports he is mainly just sore today.  -AJ       Precautions and Contraindications    Precautions/Limitations  shoulder precautions  -    Precautions  PROM ONLY PER MD  (Significant)   -AJ       Subjective Pain    Able to rate subjective pain?  yes  -    Post-Treatment Pain Level  4  -       Left Upper Ext    Lt Shoulder Abduction PROM  130°  -    Lt Shoulder Flexion PROM  149°  -    Row Name 18 1100       Left Upper Ext    Lt Shoulder Abduction PROM  144°  -    Lt Shoulder Flexion PROM  140°  -    Row Name 18 1000       Subjective Comments    Subjective Comments  states that he isn't having any pain. questions what would happen if he is activtly moving his arm  -       Precautions and Contraindications    Precautions  PROM ONLY PER MD  -       " "Subjective Pain    Able to rate subjective pain?  yes  -    Pre-Treatment Pain Level  0  -    Post-Treatment Pain Level  0  -      User Key  (r) = Recorded By, (t) = Taken By, (c) = Cosigned By    Initials Name Provider Type     Cecille Hawthorne, PTA Physical Therapy Assistant    Lana Dimas, PT Physical Therapist                      PT Assessment/Plan     Row Name 12/13/18 0900          PT Assessment    Assessment Comments  Patient did bettter with PROM for flexion, but still very guarded with abduction PROM. Improved PROM with flexion, slight decrease in abductiontoday and patient to guarded to attempt comtinueing. ALso performed some gentle mobs, level I/II and mild shoveling for pain control with PROm and to assist with relaxation.  -        PT Plan    PT Frequency  3x/week  -     PT Plan Comments  Continue with PROm only until MD clears for more. Sees MD on 12-20-18  -       User Key  (r) = Recorded By, (t) = Taken By, (c) = Cosigned By    Initials Name Provider Type    Lana Dimas, PT Physical Therapist          Modalities     Row Name 12/13/18 0900             Ice    Ice Applied  Yes  -      Location  L shoulder   -      Rx Minutes  15 mins  -      Ice S/P Rx  Yes  -        User Key  (r) = Recorded By, (t) = Taken By, (c) = Cosigned By    Initials Name Provider Type    Lana Dimas, PT Physical Therapist          Exercises     Row Name 12/13/18 0900             Subjective Comments    Subjective Comments  Patient reports he is mainly just sore today.  -         Subjective Pain    Able to rate subjective pain?  yes  -      Pre-Treatment Pain Level  -- \"sore\"  -      Post-Treatment Pain Level  4  -         Exercise 1    Exercise Name 1  pendulums 6 way  -      Time 1  3 minutes each  -         Exercise 2    Exercise Name 2  AROM elbow flex/ext  -      Reps 2  30  -         Exercise 3    Exercise Name 3  AROM Forarm Pro/Supination  -   " "   Reps 3  30  -         Exercise 4    Exercise Name 4  Incline Table Slides 3 way  -      Time 4  3 minutes each  -         Exercise 5    Exercise Name 5  Scap squeezes  -AJ      Reps 5  20  -AJ      Time 5  5\" hold  -         Exercise 6    Exercise Name 6  [place] shrugs  -AJ      Reps 6  20  -      Time 6  5\" hold  -         Exercise 7    Exercise Name 7  PROM- all planes.  -      Time 7  20 minutes  -AJ      Additional Comments  Also performed gentle Grade I/II mobs and gentle shoveling to A with pain control and relaxation.  -        User Key  (r) = Recorded By, (t) = Taken By, (c) = Cosigned By    Initials Name Provider Type    AJ Lana Pearson, PT Physical Therapist                         PT OP Goals     Row Name 12/13/18 0900          PT Short Term Goals    STG Date to Achieve  12/20/18  -     STG 1  Pt indep with HEP for L shoulder ROM/strengthening  -     STG 1 Progress  Partially Met;Ongoing  -     STG 1 Progress Comments  Not allowed to perform strengthening yet.  -     STG 2  Improve L shoulder flex AAROM to 110°  -     STG 2 Progress  Ongoing  -     STG 3  Improve L shoulder abduction AAROM to 90°  -     STG 3 Progress  Ongoing  -     STG 4  Improve L shoulder ER AAROM to 40°  -     STG 4 Progress  Ongoing  -     STG 5  Improve L shoulder MMT to 3/5  -     STG 5 Progress  Ongoing  -        Long Term Goals    LTG Date to Achieve  01/03/19  -     LTG 1  Reduce L shoulder pain to 0-1/10 level (at worst) performing ADL's  -     LTG 1 Progress  Ongoing  -     LTG 2  Improve L shoulder flex and abduction AROM to 150° and 110°  -     LTG 2 Progress  Ongoing  -     LTG 3  Improve L shoulder ER AROM to 60°  -     LTG 3 Progress  Ongoing  -     LTG 4  Improve L shoulder MMT to 4-/5  -     LTG 4 Progress  Ongoing  -     LTG 5  Reduce Quick Dash score to 40 or less  -     LTG 5 Progress  Ongoing  -        Time Calculation    PT Goal Re-Cert Due " Date  12/27/18  -TONY       User Key  (r) = Recorded By, (t) = Taken By, (c) = Cosigned By    Initials Name Provider Type    Lana Dimas, PT Physical Therapist          Therapy Education  Given: HEP, Symptoms/condition management, Pain management  Program: Reinforced  How Provided: Verbal, Demonstration  Provided to: Patient  Level of Understanding: Teach back education performed, Verbalized, Demonstrated              Time Calculation:   Start Time: 0932  Stop Time: 1040  Time Calculation (min): 68 min  PT Non-Billable Time (min): 15 min  Total Timed Code Minutes- PT: 53 minute(s)    Therapy Charges for Today     Code Description Service Date Service Provider Modifiers Qty    70345941539  PT THER PROC EA 15 MIN 12/13/2018 Lana Pearson, PT GP 3    61028957324 HC PT MANUAL THERAPY EA 15 MIN 12/13/2018 Lana Pearson, PT GP 1    [id number]  PT THER SUPP EA 15 MIN 12/13/2018 Lana Pearson, PT GP 1                    Lana Pearson PT, DPT, [facility]  12/13/2018

## 2018-12-17 ENCOUNTER — HOSPITAL ENCOUNTER (OUTPATIENT)
Dept: PHYSICAL THERAPY | Facility: HOSPITAL | Age: 73
Setting detail: THERAPIES SERIES
Discharge: HOME | End: 2018-12-17

## 2018-12-17 DIAGNOSIS — M25.512 ACUTE PAIN OF LEFT SHOULDER: Primary | ICD-10-CM

## 2018-12-17 DIAGNOSIS — M75.122 COMPLETE TEAR OF LEFT ROTATOR CUFF: ICD-10-CM

## 2018-12-17 PROCEDURE — 97110 THERAPEUTIC EXERCISES: CPT

## 2018-12-17 NOTE — THERAPY TREATMENT NOTE
"    Outpatient Physical Therapy Ortho Treatment Note  [facility]     Patient Name: Tacos Lechuga  : 1945  MRN: [id number]  Today's Date: 2018      Visit Date: 2018  Pt reports 2/10 pain pre treatment, \"Isaac good\"/10 pain post treatment  Reports 0% of improvement.  Attended 5/5 visits.  Insurance available:[employer]   Next MD appt: 2019.  Recertification: 2019.  Visit Dx:    ICD-10-CM ICD-9-CM   1. Acute pain of left shoulder M25.512 719.41   2. Complete tear of left rotator cuff M75.122 727.61       Patient Active Problem List   Diagnosis   • Left shoulder pain   • Complete tear of left rotator cuff        Past Medical History:   Diagnosis Date   • Arthritis    • Elevated cholesterol    • Hypertension    • Left hip pain    • Pain in shoulder     both shoulders   • Wears glasses    • Wears partial dentures         Past Surgical History:   Procedure Laterality Date   • COLONOSCOPY     • FINGER SURGERY Right     middle   • FRACTURE SURGERY      left arm       PT Ortho     Row Name 18 1000       Precautions and Contraindications    Precautions  PROM ONLY PER MD  (Significant)   -TL       Subjective Pain    Post-Treatment Pain Level  -- Isaac good  -TL       Left Upper Ext    Lt Shoulder Abduction PROM  141  -TL    Lt Shoulder Flexion PROM  174  -TL    Lt Shoulder External Rotation PROM  46 in 90 of shld abd  -TL    Lt Shoulder Internal Rotation PROM  40 in 90 degree of shld abd  -TL      User Key  (r) = Recorded By, (t) = Taken By, (c) = Cosigned By    Initials Name Provider Type    TL Estrellita Galvan, PTA Physical Therapy Assistant                      PT Assessment/Plan     Row Name 18 1000          PT Assessment    Assessment Comments  pt doing well with ROM. Pt still limited until he see doctor. Pt does see doctor on 2018. Pt working toward goals.No goals met at this time.  -TL        PT Plan    PT Frequency  3x/week  -TL     PT Plan Comments  " continue with PROM until pt see doctor. pt will need M.D. progress report note.  -TL       User Key  (r) = Recorded By, (t) = Taken By, (c) = Cosigned By    Initials Name Provider Type    Estrellita Tam PTA Physical Therapy Assistant          Modalities     Row Name 12/17/18 1000             Ice    Ice Applied  Yes  -TL      Location  left shoulder  -TL      Rx Minutes  15 mins  -TL      Ice S/P Rx  No  -TL        User Key  (r) = Recorded By, (t) = Taken By, (c) = Cosigned By    Initials Name Provider Type    Estrellita Tam PTA Physical Therapy Assistant          Exercises     Row Name 12/17/18 1000             Subjective Comments    Subjective Comments  Pt reports that he has been doing his HEP at home. Pt goes to the doctor on Dec 20th  -TL         Subjective Pain    Able to rate subjective pain?  yes  -TL      Pre-Treatment Pain Level  2  -TL      Post-Treatment Pain Level  -- Isaac good  -TL         Exercise 1    Exercise Name 1  pendulums 6 way  -TL      Time 1  3 minutes each  -TL         Exercise 2    Exercise Name 2  AROM elbow flex/ext  -TL      Reps 2  30   -TL         Exercise 3    Exercise Name 3  AROM Forarm Pro/Supination  -TL      Reps 3  30  -TL         Exercise 4    Exercise Name 4  Incline Table Slides 3 way  -TL      Time 4  4mins  -TL      Additional Comments  each direction  -TL         Exercise 5    Exercise Name 5  Scap squeezes  -TL      Reps 5  30  -TL         Exercise 6    Exercise Name 6  Shoulder shrugs  -TL      Reps 6  30  -TL         Exercise 7    Exercise Name 7  PROM- all planes.  -TL        User Key  (r) = Recorded By, (t) = Taken By, (c) = Cosigned By    Initials Name Provider Type    Estrellita Tam PTA Physical Therapy Assistant                         PT OP Goals     Row Name 12/17/18 1000          PT Short Term Goals    STG Date to Achieve  12/20/18  -TL     STG 1  Pt indep with HEP for L shoulder ROM/strengthening  -TL     STG 1 Progress  Partially Met;Ongoing   -TL     STG 2  Improve L shoulder flex AAROM to 110°  -TL     STG 2 Progress  Ongoing  -TL     STG 3  Improve L shoulder abduction AAROM to 90°  -TL     STG 3 Progress  Ongoing  -TL     STG 4  Improve L shoulder ER AAROM to 40°  -TL     STG 4 Progress  Ongoing  -TL     STG 5  Improve L shoulder MMT to 3/5  -TL     STG 5 Progress  Ongoing  -TL        Long Term Goals    LTG Date to Achieve  01/03/19  -TL     LTG 1  Reduce L shoulder pain to 0-1/10 level (at worst) performing ADL's  -TL     LTG 1 Progress  Ongoing  -TL     LTG 2  Improve L shoulder flex and abduction AROM to 150° and 110°  -TL     LTG 2 Progress  Ongoing  -TL     LTG 3  Improve L shoulder ER AROM to 60°  -TL     LTG 3 Progress  Ongoing  -TL     LTG 4  Improve L shoulder MMT to 4-/5  -TL     LTG 4 Progress  Ongoing  -TL     LTG 5  Reduce Quick Dash score to 40 or less  -TL     LTG 5 Progress  Ongoing  -TL        Time Calculation    PT Goal Re-Cert Due Date  12/27/18  -TL       User Key  (r) = Recorded By, (t) = Taken By, (c) = Cosigned By    Initials Name Provider Type    TL Estrellita Galvan PTA Physical Therapy Assistant          Therapy Education  Given: HEP, Symptoms/condition management, Pain management  Program: Reinforced  How Provided: Verbal, Demonstration  Provided to: Patient  Level of Understanding: Teach back education performed, Verbalized, Demonstrated              Time Calculation:   Start Time: 1008  Stop Time: 1125  Time Calculation (min): 77 min  PT Non-Billable Time (min): 15 min  Total Timed Code Minutes- PT: 62 minute(s)  Therapy Suggested Charges     Code   Minutes Charges    None           Therapy Charges for Today     Code Description Service Date Service Provider Modifiers Qty    27050354517 HC PT THER PROC EA 15 MIN 12/17/2018 Estrellita Galvan PTA GP 4    53993113084 HC PT THER SUPP EA 15 MIN 12/17/2018 Estrellita Galvan, WENDIE GP 1                    Estrellita Galvan PTA  12/17/2018

## 2018-12-19 ENCOUNTER — HOSPITAL ENCOUNTER (OUTPATIENT)
Dept: PHYSICAL THERAPY | Facility: HOSPITAL | Age: 73
Setting detail: THERAPIES SERIES
Discharge: HOME | End: 2018-12-19

## 2018-12-19 DIAGNOSIS — M75.122 COMPLETE TEAR OF LEFT ROTATOR CUFF: ICD-10-CM

## 2018-12-19 DIAGNOSIS — M25.512 ACUTE PAIN OF LEFT SHOULDER: Primary | ICD-10-CM

## 2018-12-19 PROCEDURE — 97110 THERAPEUTIC EXERCISES: CPT

## 2018-12-19 NOTE — THERAPY TREATMENT NOTE
Outpatient Physical Therapy Ortho Treatment Note  [facility]     Patient Name: Tacos Lechuga  : 1945  MRN: [id number]  Today's Date: 2018      Visit Date: 2018  Pt reports 0/10 pain pre treatment, 0/10 pain post treatment  Reports 35-45 % of improvement.  Attended 6/6 visits.  Insurance available:[employer]   Next MD appt: .  Recertification: 2019.  Visit Dx:    ICD-10-CM ICD-9-CM   1. Acute pain of left shoulder M25.512 719.41   2. Complete tear of left rotator cuff M75.122 727.61       Patient Active Problem List   Diagnosis   • Left shoulder pain   • Complete tear of left rotator cuff        Past Medical History:   Diagnosis Date   • Arthritis    • Elevated cholesterol    • Hypertension    • Left hip pain    • Pain in shoulder     both shoulders   • Wears glasses    • Wears partial dentures         Past Surgical History:   Procedure Laterality Date   • COLONOSCOPY     • FINGER SURGERY Right     middle   • FRACTURE SURGERY      left arm   • SHOULDER ARTHROSCOPY W/ ROTATOR CUFF REPAIR Left 2018    Procedure: ARTHROSCOPIC SUBACROMIAL DECOMPRESSION  WITH ROTATOR CUFF REPAIR;  Surgeon: Renard Jacobson MD;  Location: [facility];  Service: Orthopedics       PT Ortho     Row Name 18 1000       Subjective Comments    Subjective Comments  pt reports doing his HEP 2x a day.  -TL       Precautions and Contraindications    Precautions  PROM ONLY PER MD  (Significant)   -TL       Subjective Pain    Able to rate subjective pain?  yes  -TL    Pre-Treatment Pain Level  0  -TL    Post-Treatment Pain Level  0  -TL       Left Upper Ext    Lt Shoulder Abduction PROM  136  -TL    Lt Shoulder Flexion PROM  170  -TL    Lt Shoulder External Rotation PROM  63 in 90 of shld abd  -TL    Lt Shoulder Internal Rotation PROM  56 in 90 degrees shld abd  -TL    Row Name 18 1000       Precautions and Contraindications    Precautions  PROM ONLY PER MD   (Significant)   -TL       Subjective Pain    Post-Treatment Pain Level  -- Isaac good  -TL       Left Upper Ext    Lt Shoulder Abduction PROM  141  -TL    Lt Shoulder Flexion PROM  174  -TL    Lt Shoulder External Rotation PROM  46 in 90 of shld abd  -TL    Lt Shoulder Internal Rotation PROM  40 in 90 degree of shld abd  -TL      User Key  (r) = Recorded By, (t) = Taken By, (c) = Cosigned By    Initials Name Provider Type    Estrellita Tam PTA Physical Therapy Assistant                      PT Assessment/Plan     Row Name 12/19/18 1100          PT Assessment    Assessment Comments  Pt still doing well with PROM. Pt has little pain. Pt does need encouragement to relax with PROM. Pt has improved with PROM to the left shoulder with IR/ER.No new goals met.  -TL        PT Plan    PT Frequency  3x/week  -TL     PT Plan Comments  continue with PROm until see if we get new order from Doctor  -TL       User Key  (r) = Recorded By, (t) = Taken By, (c) = Cosigned By    Initials Name Provider Type    Estrellita Tam PTA Physical Therapy Assistant              Exercises     Row Name 12/19/18 1000             Subjective Comments    Subjective Comments  pt reports doing his HEP 2x a day.  -TL         Subjective Pain    Able to rate subjective pain?  yes  -TL      Pre-Treatment Pain Level  0  -TL      Post-Treatment Pain Level  0  -TL         Exercise 1    Exercise Name 1  pendulums 6 way  -TL      Time 1  3 minutes each  -TL         Exercise 2    Exercise Name 2  AROM elbow flex/ext  -TL      Reps 2  30  -TL         Exercise 3    Exercise Name 3  AROM Forarm Pro/Supination  -TL      Reps 3  30  -TL         Exercise 4    Exercise Name 4  Incline Table Slides 3 way  -TL      Time 4  5 mins  -TL         Exercise 5    Exercise Name 5  Scap squeezes  -TL      Reps 5  30  -TL         Exercise 6    Exercise Name 6  Shoulder shrugs  -TL      Reps 6  30  -TL         Exercise 7    Exercise Name 7  PROM- all planes.  -TL      Time 7   20  -TL        User Key  (r) = Recorded By, (t) = Taken By, (c) = Cosigned By    Initials Name Provider Type    Estrellita Tam PTA Physical Therapy Assistant                         PT OP Goals     Row Name 12/19/18 1100          PT Short Term Goals    STG Date to Achieve  12/20/18  -TL     STG 1  Pt indep with HEP for L shoulder ROM/strengthening  -TL     STG 1 Progress  Partially Met;Ongoing  -TL     STG 2  Improve L shoulder flex AAROM to 110°  -TL     STG 2 Progress  Ongoing  -TL     STG 3  Improve L shoulder abduction AAROM to 90°  -TL     STG 3 Progress  Ongoing  -TL     STG 4  Improve L shoulder ER AAROM to 40°  -TL     STG 4 Progress  Ongoing  -TL     STG 5  Improve L shoulder MMT to 3/5  -TL     STG 5 Progress  Ongoing  -TL        Long Term Goals    LTG Date to Achieve  01/03/19  -TL     LTG 1  Reduce L shoulder pain to 0-1/10 level (at worst) performing ADL's  -TL     LTG 1 Progress  Ongoing  -TL     LTG 2  Improve L shoulder flex and abduction AROM to 150° and 110°  -TL     LTG 2 Progress  Ongoing  -TL     LTG 3  Improve L shoulder ER AROM to 60°  -TL     LTG 3 Progress  Ongoing  -TL     LTG 4  Improve L shoulder MMT to 4-/5  -TL     LTG 4 Progress  Ongoing  -TL     LTG 5  Reduce Quick Dash score to 40 or less  -TL     LTG 5 Progress  Ongoing  -TL       User Key  (r) = Recorded By, (t) = Taken By, (c) = Cosigned By    Initials Name Provider Type    Estrellita Tam PTA Physical Therapy Assistant          Therapy Education  Given: HEP, Symptoms/condition management, Pain management, Posture/body mechanics  Program: Reinforced  How Provided: Verbal, Demonstration  Provided to: Patient  Level of Understanding: Teach back education performed, Verbalized, Demonstrated              Time Calculation:   Start Time: 1015  Stop Time: 1150  Time Calculation (min): 95 min  PT Non-Billable Time (min): 15 min  Total Timed Code Minutes- PT: 80 minute(s)  Therapy Suggested Charges     Code   Minutes Charges     None           Therapy Charges for Today     Code Description Service Date Service Provider Modifiers Qty    05055277154  PT THER PROC EA 15 MIN 12/19/2018 Estrellita Galvan, PTA GP 6    07170867399 HC PT THER SUPP EA 15 MIN 12/19/2018 Estrellita Galvan, PTA GP 1                    Estrellita Galvan, WENDIE  12/19/2018

## 2018-12-20 ENCOUNTER — OFFICE VISIT (OUTPATIENT)
Dept: ORTHOPEDIC SURGERY | Facility: CLINIC | Age: 73
End: 2018-12-20

## 2018-12-20 VITALS — HEIGHT: 71 IN | WEIGHT: 213 LBS | BODY MASS INDEX: 29.82 KG/M2

## 2018-12-20 DIAGNOSIS — M75.122 COMPLETE TEAR OF LEFT ROTATOR CUFF: Primary | ICD-10-CM

## 2018-12-20 PROCEDURE — 99024 POSTOP FOLLOW-UP VISIT: CPT | Performed by: ORTHOPAEDIC SURGERY

## 2018-12-20 NOTE — PROGRESS NOTES
Tacos Lechuga is a 73 y.o. male is s/p  Arthroscopic SAD with rotator cuff repair of the left shoulder.      Chief Complaint   Patient presents with   • Left Shoulder - Follow-up       HISTORY OF PRESENT ILLNESS: Patient is here today s/p Arthroscopic SAD with rotator cuff repair of the left shoulder on 11/8/2018. Patient states that he is not having any pain today.        No Known Allergies      Current Outpatient Medications:   •  Cholecalciferol (VITAMIN D) 2000 units tablet, Take 2,000 Units by mouth Daily., Disp: , Rfl:   •  latanoprost (XALATAN) 0.005 % ophthalmic solution, Administer 1 drop to both eyes Every Night., Disp: , Rfl:   •  lisinopril (PRINIVIL,ZESTRIL) 10 MG tablet, Take 40 mg by mouth 2 (Two) Times a Day., Disp: , Rfl:   •  Multiple Vitamins-Minerals (MULTIVITAMIN ADULT PO), Take 1 tablet by mouth Daily., Disp: , Rfl:   •  oxyCODONE-acetaminophen (PERCOCET) 5-325 MG per tablet, Take 1-2 tablets by mouth Every 4 (Four) Hours As Needed (Pain)., Disp: 30 tablet, Rfl: 0  •  simvastatin (ZOCOR) 10 MG tablet, Take 40 mg by mouth Every Night. Takes 0.5 tablet, Disp: , Rfl:     No fevers or chills.  No nausea or vomiting.      PHYSICAL EXAMINATION:       Tacos Lechuga is a 73 y.o. male    Patient is awake and alert, answers questions appropriately and is in no apparent distress.    GAIT:     []  Normal  []  Antalgic    Assistive device: []  None  []  Walker     []  Crutches  []  Cane     []  Wheelchair  []  Stretcher    Ortho Exam  Moving much better his numbers and physical therapy look good.  Today's ankle stiffer I get up to 90° of flexion limited external rotation.  His numbers and physical therapy go to 170 of flexion.    No results found.        ASSESSMENT:    Diagnoses and all orders for this visit:    Complete tear of left rotator cuff          PLAN he is now 6 weeks out was taught active range of motion and gentle strengthening.  We'll go slow with this follow him up in 3 to see  how he is doing, sooner with any problems    No Follow-up on file.    Renard Jacobson MD

## 2018-12-21 ENCOUNTER — HOSPITAL ENCOUNTER (OUTPATIENT)
Dept: PHYSICAL THERAPY | Facility: HOSPITAL | Age: 73
Setting detail: THERAPIES SERIES
Discharge: HOME | End: 2018-12-21

## 2018-12-21 DIAGNOSIS — M25.512 ACUTE PAIN OF LEFT SHOULDER: Primary | ICD-10-CM

## 2018-12-21 DIAGNOSIS — M75.122 COMPLETE TEAR OF LEFT ROTATOR CUFF: ICD-10-CM

## 2018-12-21 PROCEDURE — 97110 THERAPEUTIC EXERCISES: CPT

## 2018-12-21 NOTE — THERAPY TREATMENT NOTE
Outpatient Physical Therapy Ortho Treatment Note  [facility]  Cecille Hawthorne PTA       Patient Name: Tacos Lechuga  : 1945  MRN: [id number]  Today's Date: 2018      Visit Date: 2018     Visits:   Insurance Visits Approved: [email or web address]  Recert Due: 2018  MD Appt: 3 weeks  Pain: pretreatment 1/10; post treatment 0/10  Improvement: pt is subjectively reporting 35-40% improvement since initial evaluation    Visit Dx:    ICD-10-CM ICD-9-CM   1. Acute pain of left shoulder M25.512 719.41   2. Complete tear of left rotator cuff M75.122 727.61       Patient Active Problem List   Diagnosis   • Left shoulder pain   • Complete tear of left rotator cuff        Past Medical History:   Diagnosis Date   • Arthritis    • Elevated cholesterol    • Hypertension    • Left hip pain    • Pain in shoulder     both shoulders   • Wears glasses    • Wears partial dentures         Past Surgical History:   Procedure Laterality Date   • COLONOSCOPY     • FINGER SURGERY Right     middle   • FRACTURE SURGERY      left arm   • SHOULDER ARTHROSCOPY W/ ROTATOR CUFF REPAIR Left 2018    Procedure: ARTHROSCOPIC SUBACROMIAL DECOMPRESSION  WITH ROTATOR CUFF REPAIR;  Surgeon: Renard Jacobson MD;  Location: [facility];  Service: Orthopedics       PT Ortho     Row Name 18 1000       Subjective Comments    Subjective Comments  stats that he is doing well. doc told him to keep his sling handy just in case.   -       Precautions and Contraindications    Precautions/Limitations  shoulder precautions  -    Precautions  per MD, gentle AROM and strengthening   -       Subjective Pain    Able to rate subjective pain?  yes  -    Pre-Treatment Pain Level  1  -    Post-Treatment Pain Level  0  -       General ROM    GENERAL ROM COMMENTS  L Shld AAROM °, Flex 114, ER 47° at side  -    Row Name 18 1000       Subjective Comments    Subjective Comments  pt reports doing  his HEP 2x a day.  -TL       Precautions and Contraindications    Precautions  PROM ONLY PER MD  (Significant)   -TL       Subjective Pain    Able to rate subjective pain?  yes  -TL    Pre-Treatment Pain Level  0  -TL    Post-Treatment Pain Level  0  -TL       Left Upper Ext    Lt Shoulder Abduction PROM  136  -TL    Lt Shoulder Flexion PROM  170  -TL    Lt Shoulder External Rotation PROM  63 in 90 of shld abd  -TL    Lt Shoulder Internal Rotation PROM  56 in 90 degrees shld abd  -TL      User Key  (r) = Recorded By, (t) = Taken By, (c) = Cosigned By    Initials Name Provider Type     Cecille Hawthorne, WENDIE Physical Therapy Assistant    TL Estrellita Galvan, WENDIE Physical Therapy Assistant                      PT Assessment/Plan     Row Name 12/21/18 1000          PT Assessment    Assessment Comments  patient did well iwth therex issued today. have added them to HEP this treatment. good effort throughout.   -        PT Plan    PT Frequency  3x/week  -     PT Plan Comments  progress to gentle AROM and strengthening per MD orders. Next visit add shld isometrics  -       User Key  (r) = Recorded By, (t) = Taken By, (c) = Cosigned By    Initials Name Provider Type     Cecille Hawthorne, WENDIE Physical Therapy Assistant          Modalities     Row Name 12/21/18 1000             Ice    Ice Applied  Yes  -      Location  left shoulder  -      Rx Minutes  15 mins  -      Ice S/P Rx  Yes  -        User Key  (r) = Recorded By, (t) = Taken By, (c) = Cosigned By    Initials Name Provider Type     Cecille Hawthorne PTA Physical Therapy Assistant          Exercises     Row Name 12/21/18 1000             Subjective Comments    Subjective Comments  stats that he is doing well. doc told him to keep his sling handy just in case.   -         Subjective Pain    Able to rate subjective pain?  yes  -      Pre-Treatment Pain Level  1  -      Post-Treatment Pain Level  0  -         Exercise 1    Exercise Name 1  Pro II UE's   -      Time 1  10 minutes  -      Additional Comments  L 2.0  -         Exercise 2    Exercise Name 2  Pulley's 3 way  -      Time 2  3 minutes each  -         Exercise 3    Exercise Name 3  posterior shoulder rolls  -      Reps 3  20  -         Exercise 4    Exercise Name 4  scap squeezes  -      Reps 4  20  -      Time 4  5 sec hold  -         Exercise 5    Exercise Name 5  Supine Shld Wand Flexion  -      Reps 5  20  -         Exercise 6    Exercise Name 6  Supine Shld Wand IR/ER  -      Reps 6  20  -         Exercise 7    Exercise Name 7  Standing Shld Wand ABD  -      Reps 7  20  -MH         Exercise 8    Exercise Name 8  Pendulums 6 way  -      Time 8  3 minutes each  -      Additional Comments  2# dumbbell  -         Exercise 9    Exercise Name 9  B Bicep Curls   -      Reps 9  20   -      Additional Comments  3#   -         Exercise 10    Exercise Name 10  track walk with arm swing  -      Reps 10  4 laps  -      Additional Comments  2# weight  -        User Key  (r) = Recorded By, (t) = Taken By, (c) = Cosigned By    Initials Name Provider Type    Cecille Ross, PTA Physical Therapy Assistant                         PT OP Goals     Row Name 12/21/18 1000          PT Short Term Goals    STG Date to Achieve  12/20/18  -     STG 1  Pt indep with HEP for L shoulder ROM/strengthening  -     STG 1 Progress  Partially Met;Ongoing  -     STG 2  Improve L shoulder flex AAROM to 110°  -     STG 2 Progress  Met  -     STG 3  Improve L shoulder abduction AAROM to 90°  -     STG 3 Progress  Met  -     STG 4  Improve L shoulder ER AAROM to 40°  -     STG 4 Progress  Met  -     STG 5  Improve L shoulder MMT to 3/5  -     STG 5 Progress  Ongoing  -        Long Term Goals    LTG Date to Achieve  01/03/19  -     LTG 1  Reduce L shoulder pain to 0-1/10 level (at worst) performing ADL's  -     LTG 1 Progress  Ongoing  [facility]     LTG 2  Improve L  shoulder flex and abduction AROM to 150° and 110°  -     LTG 2 Progress  Ongoing  -     LTG 3  Improve L shoulder ER AROM to 60°  -     LTG 3 Progress  Ongoing  -     LTG 4  Improve L shoulder MMT to 4-/5  -     LTG 4 Progress  Ongoing  -     LTG 5  Reduce Quick Dash score to 40 or less  -     LTG 5 Progress  Ongoing  -        Time Calculation    PT Goal Re-Cert Due Date  12/27/18  -       User Key  (r) = Recorded By, (t) = Taken By, (c) = Cosigned By    Initials Name Provider Type     Cecille Hawthorne PTA Physical Therapy Assistant          Therapy Education  Education Details: wand shld flex, abd, and IR/ER; pulleys 3 way  Given: HEP, Symptoms/condition management, Pain management, Posture/body mechanics  Program: Reinforced, New  How Provided: Verbal, Demonstration, Written  Provided to: Patient  Level of Understanding: Verbalized, Demonstrated, Teach back education performed              Time Calculation:   Start Time: 1007  Stop Time: 1139  Time Calculation (min): 92 min  PT Non-Billable Time (min): 15 min  Total Timed Code Minutes- PT: 77 minute(s)    Therapy Charges for Today     Code Description Service Date Service Provider Modifiers Qty    96273850244 HC PT THER PROC EA 15 MIN 12/21/2018 Cecille Hawthorne PTA GP 5    40188995926 HC PT THER SUPP EA 15 MIN 12/21/2018 Cecille Hawthorne PTA GP 1                    Cecille Hawthorne PTA  12/21/2018

## 2018-12-24 ENCOUNTER — HOSPITAL ENCOUNTER (OUTPATIENT)
Dept: PHYSICAL THERAPY | Facility: HOSPITAL | Age: 73
Setting detail: THERAPIES SERIES
Discharge: HOME | End: 2018-12-24

## 2018-12-24 DIAGNOSIS — M75.122 COMPLETE TEAR OF LEFT ROTATOR CUFF: ICD-10-CM

## 2018-12-24 DIAGNOSIS — M25.512 ACUTE PAIN OF LEFT SHOULDER: Primary | ICD-10-CM

## 2018-12-24 PROCEDURE — 97110 THERAPEUTIC EXERCISES: CPT

## 2018-12-24 NOTE — THERAPY TREATMENT NOTE
Outpatient Physical Therapy Ortho Treatment Note  [facility]     Patient Name: Tacos Lechuga  : 1945  MRN: [id number]  Today's Date: 2018      Visit Date: 2018  Pt reports 0/10 pain pre treatment, 0/10 pain post treatment  [id number] % of improvement.  Attended  visits.  Insurance available:[employer]   Next MD appt:2019.  Recertification: 2019.  Visit Dx:    ICD-10-CM ICD-9-CM   1. Acute pain of left shoulder M25.512 719.41   2. Complete tear of left rotator cuff M75.122 727.61       Patient Active Problem List   Diagnosis   • Left shoulder pain   • Complete tear of left rotator cuff        Past Medical History:   Diagnosis Date   • Arthritis    • Elevated cholesterol    • Hypertension    • Left hip pain    • Pain in shoulder     both shoulders   • Wears glasses    • Wears partial dentures         Past Surgical History:   Procedure Laterality Date   • COLONOSCOPY     • FINGER SURGERY Right     middle   • FRACTURE SURGERY      left arm   • SHOULDER ARTHROSCOPY W/ ROTATOR CUFF REPAIR Left 2018    Procedure: ARTHROSCOPIC SUBACROMIAL DECOMPRESSION  WITH ROTATOR CUFF REPAIR;  Surgeon: Renard Jacobson MD;  Location: [facility];  Service: Orthopedics       PT Ortho     Row Name 18 1000       Precautions and Contraindications    Precautions/Limitations  no known precautions/limitations  -TL    Precautions  per MD, gentle AROM and strengthening   -TL       Subjective Pain    Able to rate subjective pain?  yes  -TL    Pre-Treatment Pain Level  0  -TL      User Key  (r) = Recorded By, (t) = Taken By, (c) = Cosigned By    Initials Name Provider Type    TL Estrellita Galvan PTA Physical Therapy Assistant                      PT Assessment/Plan     Row Name 18 1200          PT Assessment    Assessment Comments  No new goals met. Pt tolerated isometric ex well. Pt denies pain before or after treatment.  -TL        PT Plan    PT Frequency  3x/week   -TL     PT Plan Comments  conintinue with gentle ROM and strengthening. Start supine arom  -TL       User Key  (r) = Recorded By, (t) = Taken By, (c) = Cosigned By    Initials Name Provider Type    Estrellita Tam PTA Physical Therapy Assistant          Modalities     Row Name 12/24/18 1000             Ice    Ice Applied  Yes  -TL      Location  left shld  -TL      Rx Minutes  15 mins  -TL      Ice S/P Rx  Yes  -TL        User Key  (r) = Recorded By, (t) = Taken By, (c) = Cosigned By    Initials Name Provider Type    Estrellita Tam PTA Physical Therapy Assistant          Exercises     Row Name 12/24/18 1000             Subjective Comments    Subjective Comments  pt reports that he has been doing his HEP. pt denies pain coming into therapy today.  -TL         Subjective Pain    Able to rate subjective pain?  yes  -TL      Pre-Treatment Pain Level  0  -TL      Post-Treatment Pain Level  0  -TL         Exercise 1    Exercise Name 1  Pro II UE's  -TL      Time 1  10 minutes  -TL      Additional Comments  level 2  -TL         Exercise 2    Exercise Name 2  Pulley's 3 way  -TL      Time 2  3 minutes each  -TL         Exercise 3    Exercise Name 3  st shld abd with wand  -TL      Reps 3  20  -TL         Exercise 4    Exercise Name 4  supine shld flex  -TL      Reps 4  20  -TL         Exercise 5    Exercise Name 5  supine shld IR/ER wand  -TL      Reps 5  20  -TL         Exercise 6    Exercise Name 6  st isometrics flex/ext  -TL      Reps 6  10  -TL         Exercise 7    Exercise Name 7  st isometric abd/add  -TL      Reps 7  10  -TL         Exercise 8    Exercise Name 8  st isometric IR/ER  -TL      Reps 8  10  -TL         Exercise 9    Exercise Name 9  st shld ext wand  -TL      Reps 9  20  -TL        User Key  (r) = Recorded By, (t) = Taken By, (c) = Cosigned By    Initials Name Provider Type    Estrellita Tam PTA Physical Therapy Assistant                             Therapy Education  Education  Details: shld isometric shld ext  Given: HEP, Symptoms/condition management, Pain management  Program: New, Reinforced  How Provided: Verbal, Demonstration, Written  Provided to: Patient  Level of Understanding: Verbalized, Demonstrated              Time Calculation:   Start Time: 1025  Stop Time: 1123  Time Calculation (min): 58 min  PT Non-Billable Time (min): 15 min  Total Timed Code Minutes- PT: 43 minute(s)  Therapy Suggested Charges     Code   Minutes Charges    None           Therapy Charges for Today     Code Description Service Date Service Provider Modifiers Qty    38929014947 HC PT THER PROC EA 15 MIN 12/24/2018 Estrellita Galvan, PTA GP 3    33559496081 HC PT THER SUPP EA 15 MIN 12/24/2018 Estrellita Galvan, PTA GP 1                    Estrellita Galvan PTA  12/24/2018

## 2018-12-26 ENCOUNTER — HOSPITAL ENCOUNTER (OUTPATIENT)
Dept: PHYSICAL THERAPY | Facility: HOSPITAL | Age: 73
Setting detail: THERAPIES SERIES
Discharge: HOME | End: 2018-12-26

## 2018-12-26 DIAGNOSIS — M75.122 COMPLETE TEAR OF LEFT ROTATOR CUFF: ICD-10-CM

## 2018-12-26 DIAGNOSIS — M25.512 ACUTE PAIN OF LEFT SHOULDER: Primary | ICD-10-CM

## 2018-12-26 PROCEDURE — 97110 THERAPEUTIC EXERCISES: CPT | Performed by: SPECIALIST/TECHNOLOGIST

## 2018-12-26 NOTE — THERAPY TREATMENT NOTE
Outpatient Physical Therapy Ortho Treatment Note  [facility]     Patient Name: Tacos Lechuga  : 1945  MRN: [id number]  Today's Date: 2018      Visit Date: 2018     [place]    Patients reports pain as:  0/10   Patient reports overall % improvement as:  40%+   Patients attendance has been:     Insurance visits available   12 visits   Recert Date is:  18   Next MD visit is:  2019         Visit Dx:    ICD-10-CM ICD-9-CM   1. Acute pain of left shoulder M25.512 719.41   2. Complete tear of left rotator cuff M75.122 727.61       Patient Active Problem List   Diagnosis   • Left shoulder pain   • Complete tear of left rotator cuff        Past Medical History:   Diagnosis Date   • Arthritis    • Elevated cholesterol    • Hypertension    • Left hip pain    • Pain in shoulder     both shoulders   • Wears glasses    • Wears partial dentures         Past Surgical History:   Procedure Laterality Date   • COLONOSCOPY     • FINGER SURGERY Right     middle   • FRACTURE SURGERY      left arm   • SHOULDER ARTHROSCOPY W/ ROTATOR CUFF REPAIR Left 2018    Procedure: ARTHROSCOPIC SUBACROMIAL DECOMPRESSION  WITH ROTATOR CUFF REPAIR;  Surgeon: Renard Jacobson MD;  Location: [facility];  Service: Orthopedics       PT Ortho     Row Name 18 1000       Precautions and Contraindications    Precautions/Limitations  no known precautions/limitations  -TL    Precautions  per MD, gentle AROM and strengthening   -TL       Subjective Pain    Able to rate subjective pain?  yes  -TL    Pre-Treatment Pain Level  0  -TL      User Key  (r) = Recorded By, (t) = Taken By, (c) = Cosigned By    Initials Name Provider Type    Estrellita Tam PTA Physical Therapy Assistant                      PT Assessment/Plan     Row Name 18 1200          PT Assessment    Assessment Comments  millicent rx with intro gentle AROm in supine/ sidelying position.    (Pended)   -ML        PT Plan    PT  Frequency  3x/week  (Pended)   -ML     PT Plan Comments  check response and progress per goals and POC  (Pended)   -ML       User Key  (r) = Recorded By, (t) = Taken By, (c) = Cosigned By    Initials Name Provider Type    MOUNA BijanQuincynis, [facility]           Modalities     Row Name 12/26/18 1000             Subjective Pain    Pre-Treatment Pain Level  0  (Pended)   -ML         Ice    Location  left shld  (Pended)   -ML      Rx Minutes  15 mins  (Pended)   -ML      Ice S/P Rx  Yes  (Pended)   -ML        User Key  (r) = Recorded By, (t) = Taken By, (c) = Cosigned By    Initials Name Provider Type    ML Bijan Quincy Lozanonis, [facility]           Exercises     Row Name 12/26/18 1000             Subjective Comments    Subjective Comments  no new c/o's to report.  shoulder is progressing.  gets stiff sometimes though.    (Pended)   -ML         Subjective Pain    Able to rate subjective pain?  yes  (Pended)   -ML      Pre-Treatment Pain Level  0  (Pended)   -ML      Post-Treatment Pain Level  0  (Pended)   -ML         Exercise 1    Exercise Name 1  Pro II UE's  (Pended)   -ML      Time 1  10 minutes  (Pended)   -ML      Additional Comments  L1.5  (Pended)   -ML         Exercise 2    Exercise Name 2  pendulums-6 way  (Pended)   -ML      Reps 2  20x ea.   (Pended)   -ML         Exercise 3    Exercise Name 3  St bent rows  (Pended)   -ML      Reps 3  20  (Pended)   -ML         Exercise 4    Exercise Name 4  Supine: wand ch. press  (Pended)   -ML      Reps 4  10  (Pended)   -ML         Exercise 5    Exercise Name 5  Supine: wand flexion  (Pended)   -ML      Reps 5  10  (Pended)   -ML         Exercise 6    Exercise Name 6  Supine: hand clasp flexion  (Pended)   -ML      Reps 6  10  (Pended)   -ML         Exercise 7    Exercise Name 7  Supine: R-AROM flex  (Pended)   -ML      Reps 7  10  (Pended)   -ML      Additional Comments  pain/ discomfort as guide   (Pended)   -ML         Exercise 8    Exercise Name 8   Sidelying: R-AROM abd  (Pended)   -ML      Reps 8  10  (Pended)   -ML      Additional Comments  just elbow off side raise  (Pended)   -ML         Exercise 9    Exercise Name 9  Sidelying: AROM R- ER to neutral  (Pended)   -ML      Reps 9  10  (Pended)   -ML         Exercise 10    Exercise Name 10  Track walk arm swing emphasis  (Pended)   -ML      Reps 10  5 laps  (Pended)   -ML         Exercise 11    Exercise Name 11  St. Wand Sh ext.   (Pended)   -ML      Reps 11  20  (Pended)   -ML      Additional Comments  @ mirror  (Pended)   -ML         Exercise 12    Exercise Name 12  DB Bicep curls  (Pended)   -ML      Reps 12  20  (Pended)   -ML      Additional Comments  3#  (Pended)   -ML        User Key  (r) = Recorded By, (t) = Taken By, (c) = Cosigned By    Initials Name Provider Type    Quincy Kowalski ATC                          PT OP Goals     Row Name 12/26/18 1000          Time Calculation    PT Goal Re-Cert Due Date  12/27/18  (Pended)   -ML       User Key  (r) = Recorded By, (t) = Taken By, (c) = Cosigned By    Initials Name Provider Type    Quincy Kowalski, TARI           Therapy Education  Education Details: (P) HEP addition:  supine wand, supine AROM flexion, Sidelying: abd low height.    Given: (P) HEP  Program: (P) New  How Provided: (P) Verbal, Demonstration  Provided to: (P) Patient  Level of Understanding: (P) Teach back education performed, Verbalized, Demonstrated              Time Calculation:   Start Time: (P) 1012  Stop Time: (P) 1107  Time Calculation (min): (P) 55 min  Therapy Suggested Charges     Code   Minutes Charges    None           Therapy Charges for Today     Code Description Service Date Service Provider Modifiers Qty    41724484187 HC PT THER SUPP EA 15 MIN 12/26/2018 Quincy Thakkar ATC  1    97712895571 HC PT THER PROC EA 15 MIN 12/26/2018 Quincy Thakkar ATC  3                    Quincy Thakkar ATC  12/26/2018

## 2018-12-27 ENCOUNTER — HOSPITAL ENCOUNTER (OUTPATIENT)
Dept: PHYSICAL THERAPY | Facility: HOSPITAL | Age: 73
Setting detail: THERAPIES SERIES
Discharge: HOME | End: 2018-12-27

## 2018-12-27 DIAGNOSIS — M25.512 ACUTE PAIN OF LEFT SHOULDER: ICD-10-CM

## 2018-12-27 DIAGNOSIS — M75.122 COMPLETE TEAR OF LEFT ROTATOR CUFF: Primary | ICD-10-CM

## 2018-12-27 PROCEDURE — 97110 THERAPEUTIC EXERCISES: CPT | Performed by: PHYSICAL THERAPIST

## 2018-12-27 PROCEDURE — G8984 CARRY CURRENT STATUS: HCPCS | Performed by: PHYSICAL THERAPIST

## 2018-12-27 PROCEDURE — G8985 CARRY GOAL STATUS: HCPCS | Performed by: PHYSICAL THERAPIST

## 2018-12-27 NOTE — THERAPY PROGRESS REPORT/RE-CERT
Outpatient Physical Therapy Ortho Progress Note  [facility]     Patient Name: Tacos Lechuga  : 1945  MRN: [id number]  Today's Date: 2018      Visit Date: 2018  Patients reports pain as:  1/10   Patient reports overall % improvement as:  40%+   Patients attendance has been:  10/10   Insurance visits available   12 visits   Recert Date is:  19   Next MD visit is:  2019         Patient Active Problem List   Diagnosis   • Left shoulder pain   • Complete tear of left rotator cuff        Past Medical History:   Diagnosis Date   • Arthritis    • Elevated cholesterol    • Hypertension    • Left hip pain    • Pain in shoulder     both shoulders   • Wears glasses    • Wears partial dentures         Past Surgical History:   Procedure Laterality Date   • COLONOSCOPY     • FINGER SURGERY Right     middle   • FRACTURE SURGERY      left arm   • SHOULDER ARTHROSCOPY W/ ROTATOR CUFF REPAIR Left 2018    Procedure: ARTHROSCOPIC SUBACROMIAL DECOMPRESSION  WITH ROTATOR CUFF REPAIR;  Surgeon: Renard Jacobson MD;  Location: [facility];  Service: Orthopedics       Visit Dx:     ICD-10-CM ICD-9-CM   1. Complete tear of left rotator cuff M75.122 727.61   2. Acute pain of left shoulder M25.512 719.41           PT Ortho     Row Name 18 1100       Subjective Comments    Subjective Comments  pt is now 7 weeks post op, reporting very little left shoulder pain this morning. Reports % improvement thus far.   -BS       Precautions and Contraindications    Precautions/Limitations  no known precautions/limitations  -BS    Precautions  per MD, gentle AROM and strengthening   -BS       Subjective Pain    Able to rate subjective pain?  yes  -BS    Pre-Treatment Pain Level  1  -BS    Post-Treatment Pain Level  1  -BS       General ROM    GENERAL ROM COMMENTS  AROM: L shoulder-flex 127° abduction 99° ER 47°   -BS       MMT (Manual Muscle Testing)    General MMT Comments  MMT: L  shoulder-flex/abd 3+/5 (both) ER/IR 4+/5 (both) L elbow flex/ext 5/5   -BS      User Key  (r) = Recorded By, (t) = Taken By, (c) = Cosigned By    Initials Name Provider Type    Santhosh Luong, PT Physical Therapist                      Therapy Education  Given: HEP  Program: New, Reinforced  How Provided: Verbal, Demonstration  Provided to: Patient  Level of Understanding: Teach back education performed, Verbalized, Demonstrated     PT OP Goals     Row Name 12/27/18 1100          PT Short Term Goals    STG Date to Achieve  12/20/18  -BS     STG 1  Pt indep with HEP for L shoulder ROM/strengthening  -BS     STG 1 Progress  Partially Met;Ongoing  -BS     STG 2  Improve L shoulder flex AAROM to 110°  -BS     STG 2 Progress  Met  -BS     STG 3  Improve L shoulder abduction AAROM to 90°  -BS     STG 3 Progress  Met  -BS     STG 4  Improve L shoulder ER AAROM to 40°  -BS     STG 4 Progress  Met  -BS     STG 5  Improve L shoulder MMT to 3/5  -BS     STG 5 Progress  Met  -BS        Long Term Goals    LTG Date to Achieve  01/03/19  -BS     LTG 1  Reduce L shoulder pain to 0-1/10 level (at worst) performing ADL's  -BS     LTG 1 Progress  Met  -BS     LTG 2  Improve L shoulder flex and abduction AROM to 150° and 110°  -BS     LTG 2 Progress  Ongoing  -BS     LTG 3  Improve L shoulder ER AROM to 60°  -BS     LTG 3 Progress  Ongoing  -BS     LTG 4  Improve L shoulder MMT to 4-/5  -BS     LTG 4 Progress  Ongoing  -BS     LTG 5  Reduce Quick Dash score to 30 or less  -BS     LTG 5 Progress  Met;Goal Revised  -BS        Time Calculation    PT Goal Re-Cert Due Date  01/17/19  -BS       User Key  (r) = Recorded By, (t) = Taken By, (c) = Cosigned By    Initials Name Provider Type    Santhosh Luong PT Physical Therapist          PT Assessment/Plan     Row Name 12/27/18 1100          PT Assessment    Functional Limitations  Performance in self-care ADL;Performance in sport activities;Performance in work activities;Performance in  leisure activities;Limitation in home management  -BS     Impairments  Range of motion  -BS     Assessment Comments  Met most STG's, progressing well toward LTG's. Limited by L shoulder flex/abd weakness.   -BS     Please refer to paper survey for additional self-reported information  Yes  -BS     Rehab Potential  Good  -BS     Patient/caregiver participated in establishment of treatment plan and goals  Yes  -BS     Patient would benefit from skilled therapy intervention  Yes  -BS        PT Plan    PT Frequency  3x/week  -BS     Predicted Duration of Therapy Intervention (Therapy Eval)  8-12 weeks  -BS     Planned CPT's?  PT EVAL LOW COMPLEXITY: 92122;PT RE-EVAL: 88110;PT THER PROC EA 15 MIN: 11659;PT MANUAL THERAPY EA 15 MIN: 48770;PT NEUROMUSC RE-EDUCATION EA 15 MIN: 24322;PT ELECTRICAL STIM UNATTEND: ;PT ULTRASOUND EA 15 MIN: 26630;PT HOT/COLD PACK WC NONMCARE: 29853;PT THER SUPP EA 15 MIN  -BS     Physical Therapy Interventions (Optional Details)  home exercise program;joint mobilization;manual therapy techniques;modalities;neuromuscular re-education;patient/family education;ROM (Range of Motion);postural re-education;strengthening;stretching  -BS     PT Plan Comments  continue per MD protocol.   -BS       User Key  (r) = Recorded By, (t) = Taken By, (c) = Cosigned By    Initials Name Provider Type    Santhosh Luong, PT Physical Therapist          Modalities     Row Name 12/27/18 1100             Ice    Ice Applied  Yes  -BS      Location  left shld  -BS      Rx Minutes  10 mins  -BS      Ice S/P Rx  Yes  -BS        User Key  (r) = Recorded By, (t) = Taken By, (c) = Cosigned By    Initials Name Provider Type    Santhosh Luong, PT Physical Therapist        Exercises     Row Name 12/27/18 1100             Subjective Comments    Subjective Comments  pt is now 7 weeks post op, reporting very little left shoulder pain this morning. Reports % improvement thus far.   -BS         Subjective Pain    Able to  rate subjective pain?  yes  -BS      Pre-Treatment Pain Level  1  -BS      Post-Treatment Pain Level  1  -BS         Exercise 1    Exercise Name 1  Pro II UE's  -BS      Sets 1  fwd/bwd  -BS      Time 1  10 minutes  -BS      Additional Comments  L 2.0  -BS         Exercise 2    Exercise Name 2  Pulley's 3 way  -BS      Time 2  2 min ea  -BS         Exercise 3    Exercise Name 3  supine: resisted triceps  -BS      Sets 3  2  -BS      Reps 3  10  -BS      Time 3  2 lb dumbbell  -BS         Exercise 4    Exercise Name 4  Supine: wand ch. press  -BS      Sets 4  2  -BS      Reps 4  10  -BS         Exercise 5    Exercise Name 5  Standing wand shoulder ext S  -BS      Reps 5  20  -BS         Exercise 6    Exercise Name 6  resisted biceps curls  -BS      Sets 6  1  -BS      Reps 6  20  -BS      Time 6  2 lb wt  -BS         Exercise 7    Exercise Name 7  Supine: R-AROM flex  -BS      Sets 7  2  -BS      Reps 7  10  -BS         Exercise 8    Exercise Name 8  Sidelying: R-AROM abd  -BS      Reps 8  10  -BS         Exercise 9    Exercise Name 9  Sidelying: AROM R- ER to neutral  -BS      Sets 9  2  -BS      Reps 9  10  -BS        User Key  (r) = Recorded By, (t) = Taken By, (c) = Cosigned By    Initials Name Provider Type    Santhosh Luong, PT Physical Therapist                        Outcome Measure Options: Quick DASH  Quick DASH  Open a tight or new jar.: Severe Difficulty  Do heavy household chores (e.g., wash walls, wash floors): Severe Difficulty  Carry a shopping bag or briefcase: Moderate Difficulty  Wash your back: Severe Difficulty  Use a knife to cut food: Mild Difficulty  Recreational activities in which you take some force or impact through your arm, should or hand (e.g. golf, hammering, tennis, etc.): Severe Difficulty  During the past week, to what extent has your arm, shoulder, or hand problem interfered with your normal social activites with family, friends, neighbors or groups?: Not at all  During the past  week, were you limited in your work or other regular daily activities as a result of your arm, shoulder or hand problem?: Moderately Limited  Arm, Shoulder, or hand pain: None  Tingling (pins and needles) in your arm, shoulder, or hand: Mild  During the past week, how much difficulty have you had sleeping because of the pain in your arm, shoulder or hand?: No difficulty  Number of Questions Answered: 11  Quick DASH Score: 40.91         Time Calculation:     Therapy Suggested Charges     Code   Minutes Charges    None             Start Time: 1058  Stop Time: 1155  Time Calculation (min): 57 min  PT Non-Billable Time (min): 10 min  Total Timed Code Minutes- PT: 47 minute(s)     Therapy Charges for Today     Code Description Service Date Service Provider Modifiers Qty    80045156974 HC PT CARRY MOV HAND OBJ CURRENT 12/27/2018 Santhosh Ramsey, PT GP, CK 1    74070338959 HC PT CARRY MOV HAND OBJ PROJECTED 12/27/2018 Santhosh Ramsey, PT GP, CJ 1    78635616894 HC PT THER PROC EA 15 MIN 12/27/2018 Santhosh Ramsey, PT GP 3    08332649798 HC PT THER SUPP EA 15 MIN 12/27/2018 Santhosh aRmsey, PT GP 1          PT G-Codes  Outcome Measure Options: Quick DASH  Quick DASH Score: 40.91  Functional Limitation: Carrying, moving and handling objects  Carrying, Moving and Handling Objects Current Status (): At least 40 percent but less than 60 percent impaired, limited or restricted  Carrying, Moving and Handling Objects Goal Status (): At least 20 percent but less than 40 percent impaired, limited or restricted         Santhosh Ramsey, PT  12/27/2018

## 2018-12-28 ENCOUNTER — APPOINTMENT (OUTPATIENT)
Dept: PHYSICAL THERAPY | Facility: HOSPITAL | Age: 73
End: 2018-12-28

## 2018-12-31 ENCOUNTER — HOSPITAL ENCOUNTER (OUTPATIENT)
Dept: PHYSICAL THERAPY | Facility: HOSPITAL | Age: 73
Setting detail: THERAPIES SERIES
Discharge: HOME | End: 2018-12-31

## 2018-12-31 DIAGNOSIS — M75.122 COMPLETE TEAR OF LEFT ROTATOR CUFF: Primary | ICD-10-CM

## 2018-12-31 DIAGNOSIS — M25.512 ACUTE PAIN OF LEFT SHOULDER: ICD-10-CM

## 2018-12-31 PROCEDURE — 97110 THERAPEUTIC EXERCISES: CPT

## 2019-01-02 ENCOUNTER — HOSPITAL ENCOUNTER (OUTPATIENT)
Dept: PHYSICAL THERAPY | Facility: HOSPITAL | Age: 74
Setting detail: THERAPIES SERIES
Discharge: HOME | End: 2019-01-02

## 2019-01-02 DIAGNOSIS — M75.122 COMPLETE TEAR OF LEFT ROTATOR CUFF: Primary | ICD-10-CM

## 2019-01-02 DIAGNOSIS — M25.512 ACUTE PAIN OF LEFT SHOULDER: ICD-10-CM

## 2019-01-02 PROCEDURE — 97110 THERAPEUTIC EXERCISES: CPT

## 2019-01-02 NOTE — THERAPY TREATMENT NOTE
Outpatient Physical Therapy Ortho Treatment Note  [facility]     Patient Name: Tacos Lechuga  : 1945  MRN: [id number]  Today's Date: 2019      Visit Date: 2019  Pt reports 0/10 pain pre treatment, 0/10 pain post treatment  Reports 30% of improvement.  Attended 12/12 visits.  Insurance available: 12 visits  Next MD appt: 2019.  Recertification: 2019.  Visit Dx:    ICD-10-CM ICD-9-CM   1. Complete tear of left rotator cuff M75.122 727.61   2. Acute pain of left shoulder M25.512 719.41       Patient Active Problem List   Diagnosis   • Left shoulder pain   • Complete tear of left rotator cuff        Past Medical History:   Diagnosis Date   • Arthritis    • Elevated cholesterol    • Hypertension    • Left hip pain    • Pain in shoulder     both shoulders   • Wears glasses    • Wears partial dentures         Past Surgical History:   Procedure Laterality Date   • COLONOSCOPY     • FINGER SURGERY Right     middle   • FRACTURE SURGERY      left arm   • SHOULDER ARTHROSCOPY W/ ROTATOR CUFF REPAIR Left 2018    Procedure: ARTHROSCOPIC SUBACROMIAL DECOMPRESSION  WITH ROTATOR CUFF REPAIR;  Surgeon: Renard Jacobson MD;  Location: [facility];  Service: Orthopedics       PT Ortho     Row Name 19 1000       Precautions and Contraindications    Precautions/Limitations  no known precautions/limitations  -TL    Precautions  per MD, gentle AROM and strengthening   -TL       Subjective Pain    Able to rate subjective pain?  yes  -TL    Pre-Treatment Pain Level  0  -TL       General ROM    GENERAL ROM COMMENTS  arom left shld flexion 135, shld abd 128  -TL    Row Name 18 1100       Precautions and Contraindications    Precautions/Limitations  no known precautions/limitations  -TL    Precautions  per MD, gentle AROM and strengthening   -TL    Row Name 18 1000       Subjective Pain    Post-Treatment Pain Level  0  -TL      User Key  (r) = Recorded By, (t)  = Taken By, (c) = Cosigned By    Initials Name Provider Type    Estrellita Tam PTA Physical Therapy Assistant                      PT Assessment/Plan     Row Name 01/02/19 1100          PT Assessment    Assessment Comments  Pt is improving with ROm and strengthening. Pt tolerated new ex of  RTB shld rows and ER s. All short term goals met and Long term goals #1.  -TL        PT Plan    PT Frequency  3x/week  -TL     PT Plan Comments  add prone YTI and IR S with strap.   -TL       User Key  (r) = Recorded By, (t) = Taken By, (c) = Cosigned By    Initials Name Provider Type    Estrellita Tam PTA Physical Therapy Assistant              Exercises     Row Name 01/02/19 1000             Subjective Pain    Able to rate subjective pain?  yes  -TL      Pre-Treatment Pain Level  0  -TL         Exercise 1    Exercise Name 1  Pro II UE's  -TL      Sets 1  fwd/bwd  -TL      Time 1  10 minutes  -TL      Additional Comments  level 4  -TL         Exercise 2    Exercise Name 2  Pulley's 3 way  -TL      Time 2  3 mins each  -TL         Exercise 3    Exercise Name 3  full cans  -TL      Reps 3  10  -TL         Exercise 4    Exercise Name 4  wall push ups  -TL      Sets 4  2  -TL      Reps 4  10  -TL         Exercise 5    Exercise Name 5  RTB shld rows mid/high  -TL      Reps 5  10 each  -TL         Exercise 6    Exercise Name 6  RTB shld ext  -TL      Reps 6  10  -TL         Exercise 7    Exercise Name 7  sidelying shld ER   -TL      Reps 7  20  -TL      Additional Comments  #1 left shld  -TL         Exercise 8    Exercise Name 8  sidelying arom shld abd  -TL      Sets 8  2  -TL      Reps 8  10  -TL         Exercise 9    Exercise Name 9  supine flexion arom shld  -TL      Sets 9  2  -TL      Reps 9  10  -TL         Exercise 10    Exercise Name 10  Supine ER S  -TL      Time 10  3 mins  -TL        User Key  (r) = Recorded By, (t) = Taken By, (c) = Cosigned By    Initials Name Provider Type    Estrellita Tam PTA  Physical Therapy Assistant                         PT OP Goals     Row Name 01/02/19 1000          PT Short Term Goals    STG Date to Achieve  12/20/18  -TL     STG 1  Pt indep with HEP for L shoulder ROM/strengthening  -TL     STG 1 Progress  Met;Ongoing  -TL     STG 2  Improve L shoulder flex AAROM to 110°  -TL     STG 2 Progress  Met  -TL     STG 3  Improve L shoulder abduction AAROM to 90°  -TL     STG 3 Progress  Met  -TL     STG 4  Improve L shoulder ER AAROM to 40°  -TL     STG 4 Progress  Met  -TL     STG 5  Improve L shoulder MMT to 3/5  -TL     STG 5 Progress  Met  -TL        Long Term Goals    LTG Date to Achieve  01/03/19  -TL     LTG 1  Reduce L shoulder pain to 0-1/10 level (at worst) performing ADL's  -TL     LTG 1 Progress  Met  -TL     LTG 2  Improve L shoulder flex and abduction AROM to 150° and 110°  -TL     LTG 2 Progress  Ongoing;Progressing  -TL     LTG 3  Improve L shoulder ER AROM to 60°  -TL     LTG 3 Progress  Ongoing;Progressing  -TL     LTG 4  Improve L shoulder MMT to 4-/5  -TL     LTG 4 Progress  Ongoing  -TL     LTG 5  Reduce Quick Dash score to 30 or less  -TL     LTG 5 Progress  Met;Goal Revised  -TL        Time Calculation    PT Goal Re-Cert Due Date  01/17/18  -TL       User Key  (r) = Recorded By, (t) = Taken By, (c) = Cosigned By    Initials Name Provider Type    Estrellita Tam, PTA Physical Therapy Assistant          Therapy Education  Education Details: shld rows RTB , shld lat pull downs RTB  Given: HEP, Symptoms/condition management, Pain management, Posture/body mechanics  Program: New, Reinforced  How Provided: Verbal, Demonstration, Written  Provided to: Patient  Level of Understanding: Teach back education performed, Verbalized, Demonstrated              Time Calculation:   Start Time: 1017  Stop Time: 1132  Time Calculation (min): 75 min  PT Non-Billable Time (min): 15 min  Total Timed Code Minutes- PT: 60 minute(s)  Therapy Suggested Charges     Code   Minutes  Charges    None           Therapy Charges for Today     Code Description Service Date Service Provider Modifiers Qty    97612650915 HC PT THER PROC EA 15 MIN 1/2/2019 Estrellita Galvan, PTA GP 4    95161738825 HC PT THER SUPP EA 15 MIN 1/2/2019 Estrellita Galvan, PTA GP 1                    Estrellita Galvan, WENDIE  1/2/2019

## 2019-01-04 ENCOUNTER — HOSPITAL ENCOUNTER (OUTPATIENT)
Dept: PHYSICAL THERAPY | Facility: HOSPITAL | Age: 74
Setting detail: THERAPIES SERIES
Discharge: HOME | End: 2019-01-04

## 2019-01-04 DIAGNOSIS — M25.512 ACUTE PAIN OF LEFT SHOULDER: ICD-10-CM

## 2019-01-04 DIAGNOSIS — M75.122 COMPLETE TEAR OF LEFT ROTATOR CUFF: Primary | ICD-10-CM

## 2019-01-04 PROCEDURE — 97110 THERAPEUTIC EXERCISES: CPT

## 2019-01-04 NOTE — THERAPY TREATMENT NOTE
Outpatient Physical Therapy Ortho Treatment Note  [facility]     Patient Name: Tacos Lechuga  : 1945  MRN: [id number]  Today's Date: 2019      Visit Date: 2019  Pt reports 0/10 pain pre treatment,0 /10 pain post treatment  Reports 50 % of improvement.  Attended 13/ visits.  Insurance available:12 visits   Next MD appt: 2019.  Recertification: 2019.  Visit Dx:    ICD-10-CM ICD-9-CM   1. Complete tear of left rotator cuff M75.122 727.61   2. Acute pain of left shoulder M25.512 719.41       Patient Active Problem List   Diagnosis   • Left shoulder pain   • Complete tear of left rotator cuff        Past Medical History:   Diagnosis Date   • Arthritis    • Elevated cholesterol    • Hypertension    • Left hip pain    • Pain in shoulder     both shoulders   • Wears glasses    • Wears partial dentures         Past Surgical History:   Procedure Laterality Date   • COLONOSCOPY     • FINGER SURGERY Right     middle   • FRACTURE SURGERY      left arm   • SHOULDER ARTHROSCOPY W/ ROTATOR CUFF REPAIR Left 2018    Procedure: ARTHROSCOPIC SUBACROMIAL DECOMPRESSION  WITH ROTATOR CUFF REPAIR;  Surgeon: Renard Jacobson MD;  Location: [facility];  Service: Orthopedics       PT Ortho     Row Name 19 1000       Precautions and Contraindications    Precautions/Limitations  no known precautions/limitations  -TL    Precautions  per MD, gentle AROM and strengthening   -TL       Subjective Pain    Post-Treatment Pain Level  0  -TL       General ROM    GENERAL ROM COMMENTS  arom shld flex 142, shld abd arom 134,IR 56, ER 54  -TL    Row Name 19 1000       Precautions and Contraindications    Precautions/Limitations  no known precautions/limitations  -TL    Precautions  per MD, gentle AROM and strengthening   -TL       Subjective Pain    Able to rate subjective pain?  yes  -TL    Pre-Treatment Pain Level  0  -TL       General ROM    GENERAL ROM COMMENTS  arom left  shld flexion 135, shld abd 128  -TL      User Key  (r) = Recorded By, (t) = Taken By, (c) = Cosigned By    Initials Name Provider Type    Estrellita Tam PTA Physical Therapy Assistant                      PT Assessment/Plan     Row Name 01/04/19 1100          PT Assessment    Assessment Comments  All short term goals met and LTG #1met. Pt is progressing with arom in all directions. Pt pain is managable. Pt tolerated prone YTI's this date.  -TL        PT Plan    PT Frequency  3x/week  -TL     PT Plan Comments  continue to ROM and strengthening.  -TL       User Key  (r) = Recorded By, (t) = Taken By, (c) = Cosigned By    Initials Name Provider Type    Estrellita Tam PTA Physical Therapy Assistant              Exercises     Row Name 01/04/19 1000             Subjective Pain    Able to rate subjective pain?  yes  -TL      Pre-Treatment Pain Level  0  -TL      Post-Treatment Pain Level  0  -TL         Exercise 1    Exercise Name 1  Pro II UE's  -TL      Sets 1  fwd/bwd  -TL      Time 1  10 minutes  -TL      Additional Comments  level 4  -TL         Exercise 2    Exercise Name 2  Pulley's 3 way  -TL      Time 2  3 mins each  -TL         Exercise 3    Exercise Name 3  full cans  -TL      Sets 3  2  -TL      Reps 3  10  -TL         Exercise 4    Exercise Name 4  IR strap S  -TL      Reps 4  2  -TL      Time 4  1 min  -TL         Exercise 5    Exercise Name 5  supine ER S  -TL      Time 5  5 mins  -TL         Exercise 6    Exercise Name 6  Prone YTI's  -TL      Reps 6  10 each direction  -TL        User Key  (r) = Recorded By, (t) = Taken By, (c) = Cosigned By    Initials Name Provider Type    Estrellita Tam PTA Physical Therapy Assistant                         PT OP Goals     Row Name 01/04/19 1000          PT Short Term Goals    STG Date to Achieve  12/20/18  -TL     STG 1  Pt indep with HEP for L shoulder ROM/strengthening  -TL     STG 1 Progress  Met;Ongoing  -TL     STG 2  Improve L shoulder flex  AAROM to 110°  -TL     STG 2 Progress  Met  -TL     STG 3  Improve L shoulder abduction AAROM to 90°  -TL     STG 3 Progress  Met  -TL     STG 4  Improve L shoulder ER AAROM to 40°  -TL     STG 4 Progress  Met  -TL     STG 5  Improve L shoulder MMT to 3/5  -TL     STG 5 Progress  Met  -TL        Long Term Goals    LTG Date to Achieve  01/03/19  -TL     LTG 1  Reduce L shoulder pain to 0-1/10 level (at worst) performing ADL's  -TL     LTG 1 Progress  Met  -TL     LTG 2  Improve L shoulder flex and abduction AROM to 150° and 110°  -TL     LTG 2 Progress  Ongoing;Progressing  -TL     LTG 3  Improve L shoulder ER AROM to 60°  -TL     LTG 3 Progress  Ongoing;Progressing  -TL     LTG 4  Improve L shoulder MMT to 4-/5  -TL     LTG 4 Progress  Ongoing  -TL     LTG 5  Reduce Quick Dash score to 30 or less  -TL     LTG 5 Progress  Met;Goal Revised  -TL        Time Calculation    PT Goal Re-Cert Due Date  01/17/18  -TL       User Key  (r) = Recorded By, (t) = Taken By, (c) = Cosigned By    Initials Name Provider Type    TL Estrellita Galvan PTA Physical Therapy Assistant          Therapy Education  Education Details: prone YTI's  Given: HEP, Symptoms/condition management, Pain management, Posture/body mechanics  Program: New, Reinforced  How Provided: Verbal, Demonstration, Written  Provided to: Patient  Level of Understanding: Teach back education performed, Verbalized, Demonstrated              Time Calculation:   Start Time: 1020  Stop Time: 1132  Time Calculation (min): 72 min  PT Non-Billable Time (min): 15 min  Total Timed Code Minutes- PT: 57 minute(s)  Therapy Suggested Charges     Code   Minutes Charges    None           Therapy Charges for Today     Code Description Service Date Service Provider Modifiers Qty    56625462307 HC PT THER PROC EA 15 MIN 1/4/2019 Estrellita Galvan PTA GP 4    88097389409 HC PT THER SUPP EA 15 MIN 1/4/2019 Estrellita Galvan PTA GP 1                    Estrellita Galvan PTA  1/4/2019

## 2019-01-07 ENCOUNTER — HOSPITAL ENCOUNTER (OUTPATIENT)
Dept: PHYSICAL THERAPY | Facility: HOSPITAL | Age: 74
Setting detail: THERAPIES SERIES
Discharge: HOME | End: 2019-01-07

## 2019-01-07 DIAGNOSIS — M25.512 ACUTE PAIN OF LEFT SHOULDER: ICD-10-CM

## 2019-01-07 DIAGNOSIS — M75.122 COMPLETE TEAR OF LEFT ROTATOR CUFF: Primary | ICD-10-CM

## 2019-01-07 PROCEDURE — 97110 THERAPEUTIC EXERCISES: CPT

## 2019-01-07 NOTE — THERAPY TREATMENT NOTE
Outpatient Physical Therapy Ortho Treatment Note   [facility]     Patient Name: Tacos Lechuga  : 1945  MRN: [id number]  Today's Date: 2019      Visit Date: 2019  Pt reports 0/10 pain pre treatment,0 /10 pain post treatment  Reports 50% of improvement.  Attended 14/14 visits.  Insurance available: 12 visits  Next MD appt: Adam 10 2019.  Recertification: 2019.  Visit Dx:    ICD-10-CM ICD-9-CM   1. Complete tear of left rotator cuff M75.122 727.61   2. Acute pain of left shoulder M25.512 719.41       Patient Active Problem List   Diagnosis   • Left shoulder pain   • Complete tear of left rotator cuff        Past Medical History:   Diagnosis Date   • Arthritis    • Elevated cholesterol    • Hypertension    • Left hip pain    • Pain in shoulder     both shoulders   • Wears glasses    • Wears partial dentures         Past Surgical History:   Procedure Laterality Date   • COLONOSCOPY     • FINGER SURGERY Right     middle   • FRACTURE SURGERY      left arm   • SHOULDER ARTHROSCOPY W/ ROTATOR CUFF REPAIR Left 2018    Procedure: ARTHROSCOPIC SUBACROMIAL DECOMPRESSION  WITH ROTATOR CUFF REPAIR;  Surgeon: Renard Jacobson MD;  Location: [facility];  Service: Orthopedics       PT Ortho     Row Name 19 09       Precautions and Contraindications    Precautions/Limitations  no known precautions/limitations  -TL    Precautions  per MD, gentle AROM and strengthening   -TL       Subjective Pain    Able to rate subjective pain?  yes  -TL    Pre-Treatment Pain Level  0  -TL       General ROM    GENERAL ROM COMMENTS  left shld flex 152, shld abd 150 arom  -TL      User Key  (r) = Recorded By, (t) = Taken By, (c) = Cosigned By    Initials Name Provider Type    Estrellita Tam PTA Physical Therapy Assistant                      PT Assessment/Plan     Row Name 19 0900          PT Assessment    Assessment Comments  pt has improved with ROM of flexion/abd of the  left shld. pt tolerated new ex RTB flex/IR/ER this date. pt will need doctors note next visit.  -TL        PT Plan    PT Frequency  3x/week  -TL     PT Plan Comments  Needs note written on Wednesday for doctors appt.  -TL       User Key  (r) = Recorded By, (t) = Taken By, (c) = Cosigned By    Initials Name Provider Type    Estrellita Tam PTA Physical Therapy Assistant          Modalities     Row Name 01/07/19 0900             Ice    Ice Applied  Yes  -TL      Location  left shld  -TL      Rx Minutes  15 mins  -TL      Ice S/P Rx  Yes  -TL        User Key  (r) = Recorded By, (t) = Taken By, (c) = Cosigned By    Initials Name Provider Type    TL Estrellita Galvan PTA Physical Therapy Assistant          Exercises     Row Name 01/07/19 0900             Subjective Pain    Able to rate subjective pain?  yes  -TL      Pre-Treatment Pain Level  0  -TL         Exercise 1    Exercise Name 1  Pro II UE's  -TL      Sets 1  fwd/bwd  -TL      Time 1  10 mins  -TL      Additional Comments  level 5  -TL         Exercise 2    Exercise Name 2  Pulley's 3 way  -TL      Time 2  3 mins each  -TL         Exercise 3    Exercise Name 3  full cans  -TL      Sets 3  2  -TL      Reps 3  10  -TL         Exercise 4    Exercise Name 4  IR strap S  -TL      Reps 4  3  -TL      Time 4  1min  -TL         Exercise 5    Exercise Name 5  supine ER S  -TL      Time 5  5 mins  -TL         Exercise 6    Exercise Name 6  wall push ups  -TL      Reps 6  20  -TL         Exercise 7    Exercise Name 7  Shld flex RTB  -TL      Sets 7  2  -TL      Reps 7  10  -TL         Exercise 8    Exercise Name 8  shld IR/ER  -TL      Sets 8  2  -TL      Reps 8  10  -TL      Additional Comments  each  -TL         Exercise 9    Exercise Name 9  shld rows Mid  -TL      Reps 9  20  -TL      Additional Comments  RTB  -TL         Exercise 10    Exercise Name 10  SHld ext RTB  -TL      Reps 10  20  -TL        User Key  (r) = Recorded By, (t) = Taken By, (c) = Cosigned By     Initials Name Provider Type    TL Estrellita Galvan PTA Physical Therapy Assistant                         PT OP Goals     Row Name 01/07/19 0900          PT Short Term Goals    STG Date to Achieve  12/20/18  -TL     STG 1  Pt indep with HEP for L shoulder ROM/strengthening  -TL     STG 1 Progress  Met;Ongoing  -TL     STG 2  Improve L shoulder flex AAROM to 110°  -TL     STG 2 Progress  Met  -TL     STG 3  Improve L shoulder abduction AAROM to 90°  -TL     STG 3 Progress  Met  -TL     STG 4  Improve L shoulder ER AAROM to 40°  -TL     STG 4 Progress  Met  -TL     STG 5  Improve L shoulder MMT to 3/5  -TL     STG 5 Progress  Met  -TL        Long Term Goals    LTG Date to Achieve  01/03/19  -TL     LTG 1  Reduce L shoulder pain to 0-1/10 level (at worst) performing ADL's  -TL     LTG 1 Progress  Met  -TL     LTG 2  Improve L shoulder flex and abduction AROM to 150° and 110°  -TL     LTG 2 Progress  Ongoing;Progressing  -TL     LTG 3  Improve L shoulder ER AROM to 60°  -TL     LTG 3 Progress  Ongoing;Progressing  -TL     LTG 4  Improve L shoulder MMT to 4-/5  -TL     LTG 4 Progress  Ongoing  -TL     LTG 5  Reduce Quick Dash score to 30 or less  -TL     LTG 5 Progress  Met;Goal Revised  -TL        Time Calculation    PT Goal Re-Cert Due Date  01/17/19  -TL       User Key  (r) = Recorded By, (t) = Taken By, (c) = Cosigned By    Initials Name Provider Type    TL Estrellita Galvan PTA Physical Therapy Assistant                         Time Calculation:   Start Time: 0922  Stop Time: 1037  Time Calculation (min): 75 min  PT Non-Billable Time (min): 15 min  Total Timed Code Minutes- PT: 60 minute(s)  Therapy Suggested Charges     Code   Minutes Charges    None           Therapy Charges for Today     Code Description Service Date Service Provider Modifiers Qty    85419055018 HC PT THER PROC EA 15 MIN 1/7/2019 Estrellita Galvan PTA GP 4    97516021477 HC PT THER SUPP EA 15 MIN 1/7/2019 Estrellita Galvan PTA GP 1                     Estrellita Galvan, PTA  1/7/2019

## 2019-01-09 ENCOUNTER — HOSPITAL ENCOUNTER (OUTPATIENT)
Dept: PHYSICAL THERAPY | Facility: HOSPITAL | Age: 74
Setting detail: THERAPIES SERIES
Discharge: HOME | End: 2019-01-09

## 2019-01-09 DIAGNOSIS — M25.512 ACUTE PAIN OF LEFT SHOULDER: ICD-10-CM

## 2019-01-09 DIAGNOSIS — M75.122 COMPLETE TEAR OF LEFT ROTATOR CUFF: Primary | ICD-10-CM

## 2019-01-09 PROCEDURE — 97110 THERAPEUTIC EXERCISES: CPT

## 2019-01-09 NOTE — THERAPY TREATMENT NOTE
Outpatient Physical Therapy Ortho Treatment Note  [facility]     Patient Name: Tacos Lechuga  : 1945  MRN: [id number]  Today's Date: 2019      Visit Date: 2019  Pt reports 0/10 pain pre treatment, 1/10 pain post treatment  Reports 70% of improvement.  Attended 15/15 visits.  Insurance available: 12 visits  Next MD appt: Adam 10 2019.  Recertification: 2019.  Visit Dx:    ICD-10-CM ICD-9-CM   1. Complete tear of left rotator cuff M75.122 727.61   2. Acute pain of left shoulder M25.512 719.41       Patient Active Problem List   Diagnosis   • Left shoulder pain   • Complete tear of left rotator cuff        Past Medical History:   Diagnosis Date   • Arthritis    • Elevated cholesterol    • Hypertension    • Left hip pain    • Pain in shoulder     both shoulders   • Wears glasses    • Wears partial dentures         Past Surgical History:   Procedure Laterality Date   • COLONOSCOPY     • FINGER SURGERY Right     middle   • FRACTURE SURGERY      left arm   • SHOULDER ARTHROSCOPY W/ ROTATOR CUFF REPAIR Left 2018    Procedure: ARTHROSCOPIC SUBACROMIAL DECOMPRESSION  WITH ROTATOR CUFF REPAIR;  Surgeon: Renard Jacobson MD;  Location: [facility];  Service: Orthopedics       PT Ortho     Row Name 19 1000       Subjective Comments    Subjective Comments  pt reports 70% improved.  -TL       Precautions and Contraindications    Precautions/Limitations  no known precautions/limitations  -TL    Precautions  per MD, gentle AROM and strengthening   -TL       Subjective Pain    Able to rate subjective pain?  yes  -TL    Pre-Treatment Pain Level  0  -TL       General ROM    GENERAL ROM COMMENTS  arom left shld flex 165, abd 160,ER 70, IR 60  -TL    Row Name 19 0900       Precautions and Contraindications    Precautions/Limitations  no known precautions/limitations  -TL    Precautions  per MD, gentle AROM and strengthening   -TL       Subjective Pain    Able to  rate subjective pain?  yes  -TL    Pre-Treatment Pain Level  0  -TL       General ROM    GENERAL ROM COMMENTS  left shld flex 152, shld abd 150 arom  -TL      User Key  (r) = Recorded By, (t) = Taken By, (c) = Cosigned By    Initials Name Provider Type    Estrellita Tam PTA Physical Therapy Assistant                      PT Assessment/Plan     Row Name 01/09/19 1000          PT Assessment    Assessment Comments  Pt has met all short term and 1-3 and 5 long term goal met. Pt tolerated shld abd with RTB. PTA wrote out M.D. doctor note to update on pts progress.   -TL        PT Plan    PT Frequency  3x/week  -TL     PT Plan Comments  continue with strengthening as doctor allows.  -TL       User Key  (r) = Recorded By, (t) = Taken By, (c) = Cosigned By    Initials Name Provider Type    Estrellita Tam PTA Physical Therapy Assistant          Modalities     Row Name 01/09/19 1000             Ice    Ice Applied  Yes  -TL      Location  left shoulder  -TL      Rx Minutes  15 mins  -TL      Ice S/P Rx  Yes  -TL        User Key  (r) = Recorded By, (t) = Taken By, (c) = Cosigned By    Initials Name Provider Type    Estrellita Tam PTA Physical Therapy Assistant          Exercises     Row Name 01/09/19 1000             Subjective Comments    Subjective Comments  pt reports 70% improved.  -TL         Subjective Pain    Able to rate subjective pain?  yes  -TL      Pre-Treatment Pain Level  0  -TL      Post-Treatment Pain Level  1  -TL         Exercise 1    Exercise Name 1  Pro II UE's  -TL      Sets 1  fwd/bwd  -TL      Time 1  10min  -TL      Additional Comments  level 5.5  -TL         Exercise 2    Exercise Name 2  Pulley's 3 way  -TL      Time 2  3 mins each  -TL      Additional Comments  #2  -TL         Exercise 3    Exercise Name 3  supine ER S  -TL      Time 3  5 min  -TL         Exercise 4    Exercise Name 4  IR strap S  -TL      Reps 4  3  -TL      Time 4  1 min  -TL         Exercise 5    Exercise Name 5   Full cans  -TL      Sets 5  2  -TL      Reps 5  10  -TL      Additional Comments  3- way  -TL         Exercise 6    Exercise Name 6  RTB shld flex  -TL      Reps 6  20  -TL         Exercise 7    Exercise Name 7  RTB shld ext  -TL      Reps 7  20  -TL         Exercise 8    Exercise Name 8  shld IR/ER RTB  -TL      Reps 8  20 each  -TL         Exercise 9    Exercise Name 9  shld abd RTB  -TL      Reps 9  10  -TL         Exercise 10    Exercise Name 10  M.D. progress note.  -TL        User Key  (r) = Recorded By, (t) = Taken By, (c) = Cosigned By    Initials Name Provider Type    TL Estrellita Galvan, PTA Physical Therapy Assistant                         PT OP Goals     Row Name 01/09/19 1300          PT Short Term Goals    STG Date to Achieve  12/20/18  -TL     STG 1  Pt indep with HEP for L shoulder ROM/strengthening  -TL     STG 1 Progress  Met;Ongoing  -TL     STG 2  Improve L shoulder flex AAROM to 110°  -TL     STG 2 Progress  Met  -TL     STG 3  Improve L shoulder abduction AAROM to 90°  -TL     STG 3 Progress  Met  -TL     STG 4  Improve L shoulder ER AAROM to 40°  -TL     STG 4 Progress  Met  -TL     STG 5  Improve L shoulder MMT to 3/5  -TL     STG 5 Progress  Met  -TL        Long Term Goals    LTG Date to Achieve  01/03/19  -TL     LTG 1  Reduce L shoulder pain to 0-1/10 level (at worst) performing ADL's  -TL     LTG 1 Progress  Met  -TL     LTG 2  Improve L shoulder flex and abduction AROM to 150° and 110°  -TL     LTG 2 Progress  Met  -TL     LTG 3  Improve L shoulder ER AROM to 60°  -TL     LTG 3 Progress  Met  -TL     LTG 4  Improve L shoulder MMT to 4-/5  -TL     LTG 4 Progress  Ongoing;Progressing  -TL     LTG 5  Reduce Quick Dash score to 30 or less  -TL     LTG 5 Progress  Met;Goal Revised  -TL        Time Calculation    PT Goal Re-Cert Due Date  01/17/19  -TL       User Key  (r) = Recorded By, (t) = Taken By, (c) = Cosigned By    Initials Name Provider Type    TL Estrellita Galvan, PTA Physical  Therapy Assistant          Therapy Education  Education Details: shoulder abd RTB  Given: HEP, Symptoms/condition management, Pain management, Posture/body mechanics  Program: New, Reinforced  How Provided: Verbal, Demonstration, Written  Provided to: Patient  Level of Understanding: Teach back education performed, Verbalized, Demonstrated              Time Calculation:   Start Time: 1010  Stop Time: 1135  Time Calculation (min): 85 min  PT Non-Billable Time (min): 15 min  Total Timed Code Minutes- PT: 70 minute(s)  Therapy Suggested Charges     Code   Minutes Charges    None           Therapy Charges for Today     Code Description Service Date Service Provider Modifiers Qty    00265324522 HC PT THER PROC EA 15 MIN 1/9/2019 Estrellita Galvan, WENDIE GP 5    51527762735 HC PT THER SUPP EA 15 MIN 1/9/2019 Estrellita Galvan, WENDIE GP 1                    Estrellita Galvan PTA  1/9/2019

## 2019-01-10 ENCOUNTER — OFFICE VISIT (OUTPATIENT)
Dept: ORTHOPEDIC SURGERY | Facility: CLINIC | Age: 74
End: 2019-01-10

## 2019-01-10 VITALS — BODY MASS INDEX: 29.82 KG/M2 | WEIGHT: 213 LBS | HEIGHT: 71 IN

## 2019-01-10 DIAGNOSIS — M75.122 COMPLETE TEAR OF LEFT ROTATOR CUFF: Primary | ICD-10-CM

## 2019-01-10 DIAGNOSIS — M25.512 LEFT SHOULDER PAIN, UNSPECIFIED CHRONICITY: ICD-10-CM

## 2019-01-10 PROCEDURE — 99024 POSTOP FOLLOW-UP VISIT: CPT | Performed by: ORTHOPAEDIC SURGERY

## 2019-01-10 NOTE — PROGRESS NOTES
Tacos Lechuga is a 73 y.o. male is s/p       Chief Complaint   Patient presents with   • Left Shoulder - Follow-up, Pain       HISTORY OF PRESENT ILLNESS: fu left shoulder. Surgery done on 11/8/2018. Physical therapy at [facility], no new complaints. Pain score 0/10 today.  Release is improved in terms of his motion.       No Known Allergies      Current Outpatient Medications:   •  Cholecalciferol (VITAMIN D) 2000 units tablet, Take 2,000 Units by mouth Daily., Disp: , Rfl:   •  latanoprost (XALATAN) 0.005 % ophthalmic solution, Administer 1 drop to both eyes Every Night., Disp: , Rfl:   •  lisinopril (PRINIVIL,ZESTRIL) 10 MG tablet, Take 40 mg by mouth 2 (Two) Times a Day., Disp: , Rfl:   •  Multiple Vitamins-Minerals (MULTIVITAMIN ADULT PO), Take 1 tablet by mouth Daily., Disp: , Rfl:   •  simvastatin (ZOCOR) 10 MG tablet, Take 40 mg by mouth Every Night. Takes 0.5 tablet, Disp: , Rfl:     No fevers or chills.  No nausea or vomiting.      PHYSICAL EXAMINATION:       Tacos Lechuga is a 73 y.o. male    Patient is awake and alert, answers questions appropriately and is in no apparent distress.    GAIT:     [x]  Normal  []  Antalgic    Assistive device: [x]  None  []  Walker     []  Crutches  []  Cane     []  Wheelchair  []  Stretcher    Ortho Exam motion is much improved.  His 165 of flexion and abduction any very pleased with his progress is neurovascularly intact.  No results found.        ASSESSMENT:    Diagnoses and all orders for this visit:    Complete tear of left rotator cuff    Left shoulder pain, unspecified chronicity          PLAN he is only 2 months out any stenosis slow the check him back in a month probably release him at that point he continues to do well    No Follow-up on file.    Renard Jacobson MD

## 2019-01-11 ENCOUNTER — HOSPITAL ENCOUNTER (OUTPATIENT)
Dept: PHYSICAL THERAPY | Facility: HOSPITAL | Age: 74
Setting detail: THERAPIES SERIES
Discharge: HOME | End: 2019-01-11

## 2019-01-11 DIAGNOSIS — M75.122 COMPLETE TEAR OF LEFT ROTATOR CUFF: Primary | ICD-10-CM

## 2019-01-11 DIAGNOSIS — M25.512 ACUTE PAIN OF LEFT SHOULDER: ICD-10-CM

## 2019-01-11 PROCEDURE — 97110 THERAPEUTIC EXERCISES: CPT | Performed by: PHYSICAL THERAPIST

## 2019-01-11 NOTE — THERAPY PROGRESS REPORT/RE-CERT
Outpatient Physical Therapy Ortho Progress Note  [facility]     Patient Name: Tacos Lechuga  : 1945  MRN: [id number]  Today's Date: 2019      Visit Date: 2019  Pt reports 0/10 pain pre treatment, 0/10 pain post treatment  Reports 70% of improvement.  Attended 16/16 visits.  Insurance available: 12 visits  Next MD appt: Adam 10 2019.  Recertification: 2019.      Patient Active Problem List   Diagnosis   • Left shoulder pain   • Complete tear of left rotator cuff        Past Medical History:   Diagnosis Date   • Arthritis    • Elevated cholesterol    • Hypertension    • Left hip pain    • Pain in shoulder     both shoulders   • Wears glasses    • Wears partial dentures         Past Surgical History:   Procedure Laterality Date   • COLONOSCOPY     • FINGER SURGERY Right     middle   • FRACTURE SURGERY      left arm   • SHOULDER ARTHROSCOPY W/ ROTATOR CUFF REPAIR Left 2018    Procedure: ARTHROSCOPIC SUBACROMIAL DECOMPRESSION  WITH ROTATOR CUFF REPAIR;  Surgeon: Renard Jacobson MD;  Location: [facility];  Service: Orthopedics       Visit Dx:     ICD-10-CM ICD-9-CM   1. Complete tear of left rotator cuff M75.122 727.61   2. Acute pain of left shoulder M25.512 719.41           PT Ortho     Row Name 19 1000       Subjective Comments    Subjective Comments  pt reports no L shoulder pain this morning.   -BS       Precautions and Contraindications    Precautions/Limitations  no known precautions/limitations  -BS    Precautions  per MD, gentle AROM and strengthening   -BS       Subjective Pain    Pre-Treatment Pain Level  0  -BS    Post-Treatment Pain Level  0  -BS       General ROM    GENERAL ROM COMMENTS  AROM: L shoulder-flex 164° abduction 160° ER 70° IR 63°  -BS       MMT (Manual Muscle Testing)    General MMT Comments  L shoulder: flex 4/5 abd 4/5 ER 4/5 IR 5/5 L elbow flex/ext 5/5  -BS    Row Name 19 1000       Subjective Comments    Subjective  Comments  pt reports 70% improved.  -TL       Precautions and Contraindications    Precautions/Limitations  no known precautions/limitations  -TL    Precautions  per MD, gentle AROM and strengthening   -TL       Subjective Pain    Able to rate subjective pain?  yes  -TL    Pre-Treatment Pain Level  0  -TL       General ROM    GENERAL ROM COMMENTS  arom left shld flex 165, abd 160,ER 70, IR 60  -TL      User Key  (r) = Recorded By, (t) = Taken By, (c) = Cosigned By    Initials Name Provider Type    BS Santhosh Ramsey, PT Physical Therapist    TL Estrellita Galvan, PTA Physical Therapy Assistant                      Therapy Education  Education Details: counter top push ups  Given: HEP  Program: New, Reinforced  How Provided: Verbal, Demonstration  Provided to: Patient  Level of Understanding: Teach back education performed     PT OP Goals     Row Name 01/11/19 1018 01/11/19 1000       PT Short Term Goals    STG Date to Achieve  12/20/18  -BS  --    STG 1  Pt indep with HEP for L shoulder ROM/strengthening  -BS  --    STG 1 Progress  Met;Ongoing  -BS  --    STG 2  Improve L shoulder flex AAROM to 110°  -BS  --    STG 2 Progress  Met  -BS  --    STG 3  Improve L shoulder abduction AAROM to 90°  -BS  --    STG 3 Progress  Met  -BS  --    STG 4  Improve L shoulder ER AAROM to 40°  -BS  --    STG 4 Progress  Met  -BS  --    STG 5  Improve L shoulder MMT to 3/5  -BS  --    STG 5 Progress  Met  -BS  --       Long Term Goals    LTG Date to Achieve  01/03/19  -BS  --    LTG 1  Reduce L shoulder pain to 0-1/10 level (at worst) performing ADL's  -BS  --    LTG 1 Progress  Met  -BS  --    LTG 2  Improve L shoulder flex and abduction AROM to 150° and 110°  -BS  --    LTG 2 Progress  Met  -BS  --    LTG 3  Improve L shoulder ER AROM to 60°  -BS  --    LTG 3 Progress  Met  -BS  --    LTG 4  Improve L shoulder MMT to 4-/5  -BS  --    LTG 4 Progress  Ongoing;Progressing  -BS  --    LTG 5  Reduce Quick Dash score to 30 or less  -BS  --     LTG 5 Progress  Progressing  -BS  --       Time Calculation    PT Goal Re-Cert Due Date  --  -BS  02/01/19  -BS      User Key  (r) = Recorded By, (t) = Taken By, (c) = Cosigned By    Initials Name Provider Type    Santhosh Luong, PT Physical Therapist          PT Assessment/Plan     Row Name 01/11/19 1200          PT Assessment    Functional Limitations  Performance in self-care ADL;Performance in sport activities;Performance in work activities;Performance in leisure activities;Limitation in home management  -BS     Impairments  Range of motion  -BS     Assessment Comments  steadily progressing toward ROM/MMT goals. Slightly limited IR AROM and weakest with L shoulder flex/abd>ER.  -BS     Please refer to paper survey for additional self-reported information  Yes  -BS     Rehab Potential  Good  -BS     Patient/caregiver participated in establishment of treatment plan and goals  Yes  -BS     Patient would benefit from skilled therapy intervention  Yes  -BS        PT Plan    PT Frequency  1x/week;2x/week  -BS     Predicted Duration of Therapy Intervention (Therapy Eval)  4 weeks  -BS     Planned CPT's?  PT EVAL LOW COMPLEXITY: 63598;PT RE-EVAL: 75043;PT THER PROC EA 15 MIN: 50719;PT MANUAL THERAPY EA 15 MIN: 52044;PT HOT OR COLD PACK TREAT MCARE;PT ELECTRICAL STIM UNATTEND: ;PT THER SUPP EA 15 MIN  -BS     Physical Therapy Interventions (Optional Details)  home exercise program;joint mobilization;manual therapy techniques;modalities;patient/family education;postural re-education;ROM (Range of Motion);strengthening;stretching  -BS     PT Plan Comments  continue with RTC strengthening per MD orders, add  sidelying sleeper stretch, as well as TB or resisted scaption with weights.  -BS       User Key  (r) = Recorded By, (t) = Taken By, (c) = Cosigned By    Initials Name Provider Type    Santhosh Luong, PT Physical Therapist          Modalities     Row Name 01/11/19 1000             Ice    Ice Applied  Yes   -BS      Location  left shoulder  -BS      Rx Minutes  15 mins  -BS      Ice S/P Rx  Yes  -BS        User Key  (r) = Recorded By, (t) = Taken By, (c) = Cosigned By    Initials Name Provider Type    Santhosh Luong, PT Physical Therapist        Exercises     Row Name 01/11/19 1000             Subjective Comments    Subjective Comments  pt reports no L shoulder pain this morning.   -BS         Subjective Pain    Able to rate subjective pain?  yes  -BS      Pre-Treatment Pain Level  0  -BS      Post-Treatment Pain Level  0  -BS         Exercise 1    Exercise Name 1  Pro II UE's  -BS         Exercise 4    Exercise Name 4  IR strap S  -BS        User Key  (r) = Recorded By, (t) = Taken By, (c) = Cosigned By    Initials Name Provider Type    Santhosh Luong, PT Physical Therapist                        Outcome Measure Options: Quick DASH  Quick DASH  Open a tight or new jar.: Moderate Difficulty  Do heavy household chores (e.g., wash walls, wash floors): Moderate Difficulty  Carry a shopping bag or briefcase: Mild Difficulty  Wash your back: Mild Difficulty  Use a knife to cut food: Mild Difficulty  Recreational activities in which you take some force or impact through your arm, should or hand (e.g. golf, hammering, tennis, etc.): Severe Difficulty  During the past week, to what extent has your arm, shoulder, or hand problem interfered with your normal social activites with family, friends, neighbors or groups?: Slightly  During the past week, were you limited in your work or other regular daily activities as a result of your arm, shoulder or hand problem?: Slightly Limited  Arm, Shoulder, or hand pain: Moderate  Tingling (pins and needles) in your arm, shoulder, or hand: Mild  During the past week, how much difficulty have you had sleeping because of the pain in your arm, shoulder or hand?: Mild Difficulty  Number of Questions Answered: 11  Quick DASH Score: 36.36         Time Calculation:     Therapy Suggested  Charges     Code   Minutes Charges    None             Start Time: 1018  Stop Time: 1116  Time Calculation (min): 58 min  PT Non-Billable Time (min): 15 min  Total Timed Code Minutes- PT: 43 minute(s)     Therapy Charges for Today     Code Description Service Date Service Provider Modifiers Qty    72191759189 HC PT THER PROC EA 15 MIN 1/11/2019 Santhosh Ramsey, PT GP 3    32278023676 HC PT THER SUPP EA 15 MIN 1/11/2019 Santhosh Ramsey, PT GP 1          PT G-Codes  Outcome Measure Options: Quick DASH  Quick DASH Score: 36.36         Santhosh Ramsey, PT  1/11/2019

## 2019-01-14 ENCOUNTER — HOSPITAL ENCOUNTER (OUTPATIENT)
Dept: PHYSICAL THERAPY | Facility: HOSPITAL | Age: 74
Setting detail: THERAPIES SERIES
Discharge: HOME | End: 2019-01-14

## 2019-01-14 DIAGNOSIS — M25.512 ACUTE PAIN OF LEFT SHOULDER: ICD-10-CM

## 2019-01-14 DIAGNOSIS — M75.122 COMPLETE TEAR OF LEFT ROTATOR CUFF: Primary | ICD-10-CM

## 2019-01-14 PROCEDURE — 97110 THERAPEUTIC EXERCISES: CPT | Performed by: PHYSICAL THERAPIST

## 2019-01-14 NOTE — THERAPY TREATMENT NOTE
Outpatient Physical Therapy Ortho Treatment Note  [facility]  Lana Pearson, PT, DPT, CSCS       Patient Name: Tacos Lechuga  : 1945  MRN: [id number]  Today's Date: 2019      Visit Date: 2019     Pt reports 0/10 pain pre treatment, 0/10 pain post treatment  Reports 70% of improvement.  Attended / visits.  Insurance available: 12 visits  Next MD appt: ANITHA .  Recertification: 2019.        Visit Dx:    ICD-10-CM ICD-9-CM   1. Complete tear of left rotator cuff M75.122 727.61   2. Acute pain of left shoulder M25.512 719.41       Patient Active Problem List   Diagnosis   • Left shoulder pain   • Complete tear of left rotator cuff        Past Medical History:   Diagnosis Date   • Arthritis    • Elevated cholesterol    • Hypertension    • Left hip pain    • Pain in shoulder     both shoulders   • Wears glasses    • Wears partial dentures         Past Surgical History:   Procedure Laterality Date   • COLONOSCOPY     • FINGER SURGERY Right     middle   • FRACTURE SURGERY      left arm   • SHOULDER ARTHROSCOPY W/ ROTATOR CUFF REPAIR Left 2018    Procedure: ARTHROSCOPIC SUBACROMIAL DECOMPRESSION  WITH ROTATOR CUFF REPAIR;  Surgeon: Renard Jacobson MD;  Location: [facility];  Service: Orthopedics       PT Ortho     Row Name 19 0900       Subjective Comments    Subjective Comments  Patient rpeorts shoulder is doing well.  -AJ       Precautions and Contraindications    Precautions/Limitations  no known precautions/limitations  -AJ    Precautions  per MD, gentle AROM and strengthening   -AJ       Subjective Pain    Able to rate subjective pain?  yes  -AJ    Pre-Treatment Pain Level  0  -AJ    Post-Treatment Pain Level  0  -AJ      User Key  (r) = Recorded By, (t) = Taken By, (c) = Cosigned By    Initials Name Provider Type    Lana Dimas, PT Physical Therapist                      PT Assessment/Plan     Row Name 19 1000           PT Assessment    Assessment Comments  Progresing well overall. Fatiguded with new ther ex but able to complete.  -AJ        PT Plan    PT Frequency  1x/week;2x/week  -AJ     PT Plan Comments  Add D1/D2 at wall  -AJ       User Key  (r) = Recorded By, (t) = Taken By, (c) = Cosigned By    Initials Name Provider Type    Lana Dimas, PT Physical Therapist              Exercises     Row Name 01/14/19 0900             Subjective Comments    Subjective Comments  Patient rpeorts shoulder is doing well.  -AJ         Subjective Pain    Able to rate subjective pain?  yes  -AJ      Pre-Treatment Pain Level  0  -AJ      Post-Treatment Pain Level  0  -AJ         Exercise 1    Exercise Name 1  Pro II UE F/R  -AJ      Time 1  10 minutes  -AJ      Additional Comments  L 5.5  -AJ         Exercise 2    Exercise Name 2  Pulley's 3 way  -AJ      Time 2  3 mins each  -AJ      Additional Comments  2#  -AJ         Exercise 3    Exercise Name 3  IR S with strap  -AJ      Reps 3  3  -AJ      Time 3  1 minute  -AJ         Exercise 4    Exercise Name 4  full cans 3-way  -AJ      Reps 4  10 each  -AJ         Exercise 5    Exercise Name 5  Wall circles >90° flex/abd: cw/ccw  -AJ      Reps 5  20 each  -AJ         Exercise 6    Exercise Name 6  CKC wall flex/ext  -AJ      Reps 6  2  -AJ      Time 6  1 min  -AJ         Exercise 7    Exercise Name 7  CKC wall horz abd/add  -AJ      Reps 7  2  -AJ      Time 7  1 minute  -AJ         Exercise 8    Exercise Name 8  RTB IR/ER  -AJ      Reps 8  20 each  -AJ        User Key  (r) = Recorded By, (t) = Taken By, (c) = Cosigned By    Initials Name Provider Type    Lana Dimas, PT Physical Therapist                         PT OP Goals     Row Name 01/14/19 0900          PT Short Term Goals    STG Date to Achieve  12/20/18  -AJ     STG 1  Pt indep with HEP for L shoulder ROM/strengthening  -AJ     STG 1 Progress  Met;Ongoing  -     STG 2  Improve L shoulder flex AAROM to 110°  -AJ      STG 2 Progress  Met  -     STG 3  Improve L shoulder abduction AAROM to 90°  -     STG 3 Progress  Met  -     STG 4  Improve L shoulder ER AAROM to 40°  -     STG 4 Progress  Met  -     STG 5  Improve L shoulder MMT to 3/5  -     STG 5 Progress  Met  -        Long Term Goals    LTG Date to Achieve  01/03/19  -     LTG 1  Reduce L shoulder pain to 0-1/10 level (at worst) performing ADL's  -     LTG 1 Progress  Met  -     LTG 2  Improve L shoulder flex and abduction AROM to 150° and 110°  -     LTG 2 Progress  Met  -     LTG 3  Improve L shoulder ER AROM to 60°  -     LTG 3 Progress  Met  -     LTG 4  Improve L shoulder MMT to 4-/5  -     LTG 4 Progress  Ongoing;Progressing  -     LTG 5  Reduce Quick Dash score to 30 or less  -     LTG 5 Progress  Progressing  -        Time Calculation    PT Goal Re-Cert Due Date  02/01/19  -       User Key  (r) = Recorded By, (t) = Taken By, (c) = Cosigned By    Initials Name Provider Type    Lana Dimas, PT Physical Therapist          Therapy Education  Given: HEP  Program: Reinforced  How Provided: Verbal, Demonstration  Provided to: Patient  Level of Understanding: Verbalized, Demonstrated              Time Calculation:   Start Time: 0930  Stop Time: 1030  Time Calculation (min): 60 min  PT Non-Billable Time (min): 15 min  Total Timed Code Minutes- PT: 45 minute(s)    Therapy Charges for Today     Code Description Service Date Service Provider Modifiers Qty    74160814580 HC PT THER PROC EA 15 MIN 1/14/2019 Lana Pearson, PT GP 3    63550422293 HC PT THER SUPP EA 15 MIN 1/14/2019 Lana Pearson, PT GP 1                    Lana Pearson PT, DPT, CSCS  1/14/2019

## 2019-01-16 ENCOUNTER — APPOINTMENT (OUTPATIENT)
Dept: PHYSICAL THERAPY | Facility: HOSPITAL | Age: 74
End: 2019-01-16

## 2019-01-18 ENCOUNTER — HOSPITAL ENCOUNTER (OUTPATIENT)
Dept: PHYSICAL THERAPY | Facility: HOSPITAL | Age: 74
Setting detail: THERAPIES SERIES
Discharge: HOME | End: 2019-01-18

## 2019-01-18 DIAGNOSIS — M75.122 COMPLETE TEAR OF LEFT ROTATOR CUFF: Primary | ICD-10-CM

## 2019-01-18 DIAGNOSIS — M25.512 ACUTE PAIN OF LEFT SHOULDER: ICD-10-CM

## 2019-01-18 PROCEDURE — 97110 THERAPEUTIC EXERCISES: CPT

## 2019-01-18 NOTE — THERAPY TREATMENT NOTE
Outpatient Physical Therapy Ortho Treatment Note  [facility]  Cecille Hawthorne PTA       Patient Name: Tacos Lechuga  : 1945  MRN: [id number]  Today's Date: 2019      Visit Date: 2019     Visits:   Insurance Visits Approved: 12 visits  Recert Due: 2019  MD Appt: TBD  Pain: pretreatment 0/10; post treatment 0/10  Improvement: pt is subjectively reporting 70% improvement since initial evaluation    Visit Dx:    ICD-10-CM ICD-9-CM   1. Complete tear of left rotator cuff M75.122 727.61   2. Acute pain of left shoulder M25.512 719.41       Patient Active Problem List   Diagnosis   • Left shoulder pain   • Complete tear of left rotator cuff        Past Medical History:   Diagnosis Date   • Arthritis    • Elevated cholesterol    • Hypertension    • Left hip pain    • Pain in shoulder     both shoulders   • Wears glasses    • Wears partial dentures         Past Surgical History:   Procedure Laterality Date   • COLONOSCOPY     • FINGER SURGERY Right     middle   • FRACTURE SURGERY      left arm   • SHOULDER ARTHROSCOPY W/ ROTATOR CUFF REPAIR Left 2018    Procedure: ARTHROSCOPIC SUBACROMIAL DECOMPRESSION  WITH ROTATOR CUFF REPAIR;  Surgeon: Renard Jacobson MD;  Location: [facility];  Service: Orthopedics       PT Ortho     Row Name 19 0800       Precautions and Contraindications    Precautions/Limitations  no known precautions/limitations  -      User Key  (r) = Recorded By, (t) = Taken By, (c) = Cosigned By    Initials Name Provider Type     Cecille Hawthorne PTA Physical Therapy Assistant                      PT Assessment/Plan     Row Name 19 0900          PT Assessment    Assessment Comments  pt did ewll with all therex. no complaints of pain. did well with all new therex today  -        PT Plan    PT Frequency  1x/week;2x/week  -     PT Plan Comments  next visit OH writing/drawing   -       User Key  (r) = Recorded By, (t) = Taken  By, (c) = Cosigned By    Initials Name Provider Type     Christoph Cecille AGUILAR PTA Physical Therapy Assistant          Modalities     Row Name 01/18/19 0800             Ice    Ice Applied  Yes  -MH      Location  left shoulder  -MH      Rx Minutes  15 mins  -MH      Ice S/P Rx  Yes  -MH        User Key  (r) = Recorded By, (t) = Taken By, (c) = Cosigned By    Initials Name Provider Type     Cecille HawthorneWENDIE Physical Therapy Assistant          Exercises     Row Name 01/18/19 0800             Subjective Comments    Subjective Comments  pt reports he is doing well.   -MH         Subjective Pain    Able to rate subjective pain?  yes  -MH      Pre-Treatment Pain Level  0  -MH      Post-Treatment Pain Level  0  -MH         Exercise 1    Exercise Name 1  Pro II UE Fwd/Retro  -MH      Time 1  10 minutes  -MH      Additional Comments  L 6.0  -MH         Exercise 2    Exercise Name 2  Pulley's 3 way  -MH      Time 2  3 minutes  -MH      Additional Comments  3#  -MH         Exercise 3    Exercise Name 3  IR S with strap  -MH      Reps 3  3 minutes  -MH         Exercise 4    Exercise Name 4  CKC on wall 6 way  -MH      Reps 4  2  -MH      Time 4  1 minute each  -MH         Exercise 5    Exercise Name 5  CKC on wall Diagnols  -MH      Reps 5  2  -MH      Time 5  1 minute  -MH         Exercise 6    Exercise Name 6  D1/D2 on wall  -MH      Reps 6  2  -MH      Time 6  1 minute each  -MH         Exercise 7    Exercise Name 7  Tband B Shld Ext  -MH      Reps 7  20  -MH      Additional Comments  Green Tband  -MH         Exercise 8    Exercise Name 8  Tband Rows Mid/Low  -MH      Reps 8  20 each  -MH      Additional Comments  Green Tband  -MH         Exercise 9    Exercise Name 9  Tband Flexion  -MH      Reps 9  20  -MH      Additional Comments  Green Tband  -MH         Exercise 10    Exercise Name 10  Tband ABD  -MH      Reps 10  20  -MH      Additional Comments  Red  -MH         Exercise 11    Exercise Name 11  Tband IR/ER  -MH       Reps 11  20 each  -      Additional Comments  Green  -         Exercise 12    Exercise Name 12  Full Can's 3 way  -      Additional Comments  10 each  -         Exercise 13    Exercise Name 13  [place] Chest Press  -      Reps 13  3  -      Time 13  1 minutes  -         Exercise 14    Exercise Name 14  Left Hand weight transfer from waist height to OH shelf  -      Time 14  5 minutes  -      Additional Comments  1#-4# varied weights  -         Exercise 15    Exercise Name 15  OH Med Ball Carry  -      Additional Comments  2#, 4#, 6#  -        User Key  (r) = Recorded By, (t) = Taken By, (c) = Cosigned By    Initials Name Provider Type    Cecille Ross, PTA Physical Therapy Assistant                         PT OP Goals     Row Name 01/18/19 1000          PT Short Term Goals    STG Date to Achieve  12/20/18  -     STG 1  Pt indep with HEP for L shoulder ROM/strengthening  -     STG 1 Progress  Met;Ongoing  -     STG 2  Improve L shoulder flex AAROM to 110°  -     STG 2 Progress  Met  -     STG 3  Improve L shoulder abduction AAROM to 90°  -     STG 3 Progress  Met  -     STG 4  Improve L shoulder ER AAROM to 40°  -     STG 4 Progress  Met  -     STG 5  Improve L shoulder MMT to 3/5  -     STG 5 Progress  Met  -        Long Term Goals    LTG Date to Achieve  01/03/19  -     LTG 1  Reduce L shoulder pain to 0-1/10 level (at worst) performing ADL's  -     LTG 1 Progress  Met  -     LTG 2  Improve L shoulder flex and abduction AROM to 150° and 110°  -     LTG 2 Progress  Met  -     LTG 3  Improve L shoulder ER AROM to 60°  -     LTG 3 Progress  Met  -     LTG 4  Improve L shoulder MMT to 4-/5  -     LTG 4 Progress  Ongoing;Progressing  -     LTG 5  Reduce Quick Dash score to 30 or less  -     LTG 5 Progress  Progressing  -        Time Calculation    PT Goal Re-Cert Due Date  02/01/19  -       User Key  (r) = Recorded By, (t) = Taken By, (c) =  Cosigned By    Initials Name Provider Type     Cecille Hawthorne PTA Physical Therapy Assistant          Therapy Education  Given: HEP, Symptoms/condition management, Pain management, Posture/body mechanics  Program: Reinforced  How Provided: Verbal, Demonstration  Provided to: Patient  Level of Understanding: Teach back education performed, Verbalized, Demonstrated              Time Calculation:   Start Time: 0840  Stop Time: 1030  Time Calculation (min): 110 min  PT Non-Billable Time (min): 15 min  Total Timed Code Minutes- PT: 95 minute(s)    Therapy Charges for Today     Code Description Service Date Service Provider Modifiers Qty    41306062340 HC PT THER PROC EA 15 MIN 1/18/2019 Cecille Hawthorne PTA GP 6    43148017076 HC PT THER SUPP EA 15 MIN 1/18/2019 Cecille Hawthorne PTA GP 1                    Cecille Hawthorne PTA  1/18/2019

## 2019-01-21 ENCOUNTER — HOSPITAL ENCOUNTER (OUTPATIENT)
Dept: PHYSICAL THERAPY | Facility: HOSPITAL | Age: 74
Setting detail: THERAPIES SERIES
Discharge: HOME | End: 2019-01-21

## 2019-01-21 DIAGNOSIS — M75.122 COMPLETE TEAR OF LEFT ROTATOR CUFF: Primary | ICD-10-CM

## 2019-01-21 DIAGNOSIS — M25.512 ACUTE PAIN OF LEFT SHOULDER: ICD-10-CM

## 2019-01-21 PROCEDURE — 97110 THERAPEUTIC EXERCISES: CPT

## 2019-01-21 NOTE — THERAPY TREATMENT NOTE
[facility]patient Physical [facility] Treatment Note  [facility]     Patient Name: Tacos Lechuga  : 1945  MRN: [id number]  Today's Date: 2019      Visit Date: 2019  Pt reports 0/10 pain pre treatment, 0/10 pain post treatment  Reports 8--85% of improvement.  Attended  visits.  Insurance available:12 visits   Next MD appt: 2019.  Recertification: 2019.  Visit Dx:    ICD-10-CM ICD-9-CM   1. Complete tear of left rotator cuff M75.122 727.61   2. Acute pain of left shoulder M25.512 719.41       Patient Active Problem List   Diagnosis   • Left shoulder pain   • Complete tear of left rotator cuff        Past Medical History:   Diagnosis Date   • Arthritis    • Elevated cholesterol    • Hypertension    • Left hip pain    • Pain in shoulder     both shoulders   • Wears glasses    • Wears partial dentures         Past Surgical History:   Procedure Laterality Date   • COLONOSCOPY     • FINGER SURGERY Right     middle   • FRACTURE SURGERY      left arm   • SHOULDER ARTHROSCOPY W/ ROTATOR CUFF REPAIR Left 2018    Procedure: ARTHROSCOPIC SUBACROMIAL DECOMPRESSION  WITH ROTATOR CUFF REPAIR;  Surgeon: Renard Jacobson MD;  Location: [facility];  Service: Orthopedics                       PT Assessment/Plan     Row Name 19 0900          PT Assessment    Assessment Comments  Pt doing well. Pt has met all short term goals and 1-3 Long term goals. pt tolerated new ex writting OH for flexion. No new c/o.  -TL        PT Plan    PT Frequency  1x/week;2x/week  -TL     PT Plan Comments  continue strengthening as pt tolerates. Add ball push up on shuttle next visit.  -TL       User Key  (r) = Recorded By, (t) = Taken By, (c) = Cosigned By    Initials Name Provider Type    Estrellita Tam PTA Physical Therapy Assistant              Exercises     Row Name 19 0900             Subjective Comments    Subjective Comments  Pt reported that he was sore on  Saturday but better today.  -TL         Subjective Pain    Able to rate subjective pain?  yes  -TL      Pre-Treatment Pain Level  0  -TL         Exercise 1    Exercise Name 1  Pro II UE Fwd/Retro  -TL      Time 1  10 mins  -TL      Additional Comments  level 6.0  -TL         Exercise 2    Exercise Name 2  Pulley's 3 way  -TL      Time 2  3 minutes  -TL      Additional Comments  3#  -TL         Exercise 3    Exercise Name 3  OH writting  -TL      Reps 3  3mins  -TL         Exercise 4    Exercise Name 4  IR S strap  -TL      Reps 4  3  -TL      Time 4  1 min  -TL         Exercise 5    Exercise Name 5  CKC on wall 6 ways  -TL      Reps 5  2  -TL      Time 5  1 min  -TL         Exercise 6    Exercise Name 6  CKC on wall diagnols  -TL      Reps 6  2  -TL      Time 6  1 min  -TL         Exercise 7    Exercise Name 7  D1/D2 on wall  -TL      Reps 7  2  -TL      Time 7  1 min  -TL        User Key  (r) = Recorded By, (t) = Taken By, (c) = Cosigned By    Initials Name Provider Type    Estrellita Tam, PTA Physical Therapy Assistant                         PT OP Goals     Row Name 01/21/19 0900          PT Short Term Goals    STG Date to Achieve  12/20/18  -TL     STG 1  Pt indep with HEP for L shoulder ROM/strengthening  -TL     STG 1 Progress  Met;Ongoing  -TL     STG 2  Improve L shoulder flex AAROM to 110°  -TL     STG 2 Progress  Met  -TL     STG 3  Improve L shoulder abduction AAROM to 90°  -TL     STG 3 Progress  Met  -TL     STG 4  Improve L shoulder ER AAROM to 40°  -TL     STG 4 Progress  Met  -TL     STG 5  Improve L shoulder MMT to 3/5  -TL     STG 5 Progress  Met  -TL        Long Term Goals    LTG Date to Achieve  01/03/19  -TL     LTG 1  Reduce L shoulder pain to 0-1/10 level (at worst) performing ADL's  -TL     LTG 1 Progress  Met  -TL     LTG 2  Improve L shoulder flex and abduction AROM to 150° and 110°  -TL     LTG 2 Progress  Met  -TL     LTG 3  Improve L shoulder ER AROM to 60°  -TL     LTG 3 Progress   Met  -TL     LTG 4  Improve L shoulder MMT to 4-/5  -TL     LTG 4 Progress  Ongoing;Progressing  -TL     LTG 5  Reduce Quick Dash score to 30 or less  -TL     LTG 5 Progress  Progressing  -TL        Time Calculation    PT Goal Re-Cert Due Date  02/01/19  -TL       User Key  (r) = Recorded By, (t) = Taken By, (c) = Cosigned By    Initials Name Provider Type    TL Estrellita Galvan PTA Physical Therapy Assistant          Therapy Education  Given: HEP, Symptoms/condition management, Pain management, Posture/body mechanics  Program: Reinforced  How Provided: Verbal  Provided to: Patient  Level of Understanding: Teach back education performed              Time Calculation:   Start Time: 0931  Stop Time: 1036  Time Calculation (min): 65 min  PT Non-Billable Time (min): 15 min  Total Timed Code Minutes- PT: 50 minute(s)  Therapy Suggested Charges     Code   Minutes Charges    None           Therapy Charges for Today     Code Description Service Date Service Provider Modifiers Qty    05406171637 HC PT THER PROC EA 15 MIN 1/21/2019 Estrellita Galvan PTA GP 3    72866892018 HC PT THER SUPP EA 15 MIN 1/21/2019 Estrellita Galvan PTA GP 1                    Estrellita Galvan PTA  1/21/2019

## 2019-01-25 ENCOUNTER — HOSPITAL ENCOUNTER (OUTPATIENT)
Dept: PHYSICAL THERAPY | Facility: HOSPITAL | Age: 74
Setting detail: THERAPIES SERIES
Discharge: HOME | End: 2019-01-25

## 2019-01-25 DIAGNOSIS — M75.122 COMPLETE TEAR OF LEFT ROTATOR CUFF: Primary | ICD-10-CM

## 2019-01-25 DIAGNOSIS — M25.512 ACUTE PAIN OF LEFT SHOULDER: ICD-10-CM

## 2019-01-25 PROCEDURE — 97110 THERAPEUTIC EXERCISES: CPT

## 2019-01-25 NOTE — THERAPY TREATMENT NOTE
Outpatient Physical Therapy Ortho Treatment Note  [facility]     Patient Name: Tacos Lechuga  : 1945  MRN: [id number]  Today's Date: 2019      Visit Date: 2019  Pt reports 1/10 pain pre treatment, 0/10 pain post treatment  Reports 85% of improvement.  Attended 20/20 visits.  Insurance available: 12 visits  Next MD appt: 2019.  Recertification: 2019.  Visit Dx:    ICD-10-CM ICD-9-CM   1. Complete tear of left rotator cuff M75.122 727.61   2. Acute pain of left shoulder M25.512 719.41       Patient Active Problem List   Diagnosis   • Left shoulder pain   • Complete tear of left rotator cuff        Past Medical History:   Diagnosis Date   • Arthritis    • Elevated cholesterol    • Hypertension    • Left hip pain    • Pain in shoulder     both shoulders   • Wears glasses    • Wears partial dentures         Past Surgical History:   Procedure Laterality Date   • COLONOSCOPY     • FINGER SURGERY Right     middle   • FRACTURE SURGERY      left arm   • SHOULDER ARTHROSCOPY W/ ROTATOR CUFF REPAIR Left 2018    Procedure: ARTHROSCOPIC SUBACROMIAL DECOMPRESSION  WITH ROTATOR CUFF REPAIR;  Surgeon: Renard Jacobson MD;  Location: [facility];  Service: Orthopedics       PT Ortho     Row Name 19 0900       Subjective Comments    Subjective Comments  pt felt over done ex on Wednesday.  -TL       Precautions and Contraindications    Precautions/Limitations  no known precautions/limitations  -TL       Subjective Pain    Able to rate subjective pain?  yes  -TL    Pre-Treatment Pain Level  1  -TL      User Key  (r) = Recorded By, (t) = Taken By, (c) = Cosigned By    Initials Name Provider Type    Estrellita Tam PTA Physical Therapy Assistant                      PT Assessment/Plan     Row Name 19 1000          PT Assessment    Assessment Comments  pt has met all short term goals and 1-3 long term goals. Pt progressing well . Pt tolerated shuttle  ball push up  and the YTI with 1 # in prone. Pt progressing well.  -TL        PT Plan    PT Frequency  1x/week;2x/week  -TL     PT Plan Comments  check strength  -TL       User Key  (r) = Recorded By, (t) = Taken By, (c) = Cosigned By    Initials Name Provider Type    Estrellita Tam PTA Physical Therapy Assistant          Modalities     Row Name 01/25/19 0900             Ice    Ice Applied  Yes  -TL      Location  left shoulder  -TL      Rx Minutes  15 mins  -TL      Ice S/P Rx  Yes  -TL        User Key  (r) = Recorded By, (t) = Taken By, (c) = Cosigned By    Initials Name Provider Type    TL Estrellita Galvan PTA Physical Therapy Assistant          Exercises     Row Name 01/25/19 0900             Subjective Comments    Subjective Comments  pt felt over done ex on Wednesday.  -TL         Subjective Pain    Able to rate subjective pain?  yes  -TL      Pre-Treatment Pain Level  1  -TL         Exercise 1    Exercise Name 1  Pro II UE Fwd/Retro  -TL      Time 1  10mins  -TL      Additional Comments  6.0  -TL         Exercise 2    Exercise Name 2  Pulley's 3 way  -TL      Time 2  3 mins  -TL      Additional Comments  3#  -TL         Exercise 3    Exercise Name 3  OH writting  -TL      Reps 3  3 mins  -TL         Exercise 4    Exercise Name 4  OH 2# ball  -TL      Reps 4  2  -TL      Time 4  1 min  -TL         Exercise 5    Exercise Name 5  track walk 1/4 carrying 8lb ball  -TL      Additional Comments  left hand  -TL         Exercise 6    Exercise Name 6  push up with ball on shuttle  -TL      Reps 6  20  -TL      Additional Comments  3 cords  -TL         Exercise 7    Exercise Name 7  IR with strap  -TL      Reps 7  3  -TL      Time 7  1 min  -TL         Exercise 8    Exercise Name 8  prone YTI's  -TL      Reps 8  20 each direction  -TL      Additional Comments  #1  -TL        User Key  (r) = Recorded By, (t) = Taken By, (c) = Cosigned By    Initials Name Provider Type    Estrellita Tam PTA Physical Therapy  Assistant                         PT OP Goals     Row Name 01/25/19 0900          PT Short Term Goals    STG Date to Achieve  12/20/18  -TL     STG 1  Pt indep with HEP for L shoulder ROM/strengthening  -TL     STG 1 Progress  Met;Ongoing  -TL     STG 2  Improve L shoulder flex AAROM to 110°  -TL     STG 2 Progress  Met  -TL     STG 3  Improve L shoulder abduction AAROM to 90°  -TL     STG 3 Progress  Met  -TL     STG 4  Improve L shoulder ER AAROM to 40°  -TL     STG 4 Progress  Met  -TL     STG 5  Improve L shoulder MMT to 3/5  -TL     STG 5 Progress  Met  -TL        Long Term Goals    LTG Date to Achieve  01/03/19  -TL     LTG 1  Reduce L shoulder pain to 0-1/10 level (at worst) performing ADL's  -TL     LTG 1 Progress  Met  -TL     LTG 2  Improve L shoulder flex and abduction AROM to 150° and 110°  -TL     LTG 2 Progress  Met  -TL     LTG 3  Improve L shoulder ER AROM to 60°  -TL     LTG 3 Progress  Met  -TL     LTG 4  Improve L shoulder MMT to 4-/5  -TL     LTG 4 Progress  Ongoing;Progressing  -TL     LTG 5  Reduce Quick Dash score to 30 or less  -TL     LTG 5 Progress  Progressing  -TL        Time Calculation    PT Goal Re-Cert Due Date  02/01/19  -TL       User Key  (r) = Recorded By, (t) = Taken By, (c) = Cosigned By    Initials Name Provider Type    TL Estrellita Galvan PTA Physical Therapy Assistant          Therapy Education  Given: HEP, Symptoms/condition management, Pain management, Posture/body mechanics  Program: Reinforced  How Provided: Verbal  Provided to: Patient  Level of Understanding: Teach back education performed              Time Calculation:   Start Time: 0930  PT Non-Billable Time (min): 15 min  Therapy Suggested Charges     Code   Minutes Charges    None           Therapy Charges for Today     Code Description Service Date Service Provider Modifiers Qty    68531582751 HC PT THER PROC EA 15 MIN 1/25/2019 Estrellita Galvan PTA GP 4    51143716416 HC PT THER SUPP EA 15 MIN 1/25/2019 Cole  Estrellita MEDELLIN, PTA GP 1                    Estrellita Galvan, PTA  1/25/2019

## 2019-01-28 ENCOUNTER — APPOINTMENT (OUTPATIENT)
Dept: PHYSICAL THERAPY | Facility: HOSPITAL | Age: 74
End: 2019-01-28

## 2019-01-29 ENCOUNTER — HOSPITAL ENCOUNTER (OUTPATIENT)
Dept: PHYSICAL THERAPY | Facility: HOSPITAL | Age: 74
Setting detail: THERAPIES SERIES
Discharge: HOME | End: 2019-01-29

## 2019-01-29 DIAGNOSIS — M75.122 COMPLETE TEAR OF LEFT ROTATOR CUFF: Primary | ICD-10-CM

## 2019-01-29 DIAGNOSIS — M25.512 ACUTE PAIN OF LEFT SHOULDER: ICD-10-CM

## 2019-01-29 PROCEDURE — 97110 THERAPEUTIC EXERCISES: CPT | Performed by: SPECIALIST/TECHNOLOGIST

## 2019-01-29 NOTE — THERAPY TREATMENT NOTE
Outpatient Physical Therapy Ortho Treatment Note  [facility]     Patient Name: Tacos Lechuga  : 1945  MRN: [id number]  Today's Date: 2019      Visit Date: 2019     [place]    Patients reports pain as:  0/10   Patient reports overall % improvement as:  90%   Patients attendance has been:     Insurance visits available  24 visits   Recert Date is:  19   Next MD visit is:  19         Visit Dx:    ICD-10-CM ICD-9-CM   1. Complete tear of left rotator cuff M75.122 727.61   2. Acute pain of left shoulder M25.512 719.41       Patient Active Problem List   Diagnosis   • Left shoulder pain   • Complete tear of left rotator cuff        Past Medical History:   Diagnosis Date   • Arthritis    • Elevated cholesterol    • Hypertension    • Left hip pain    • Pain in shoulder     both shoulders   • Wears glasses    • Wears partial dentures         Past Surgical History:   Procedure Laterality Date   • COLONOSCOPY     • FINGER SURGERY Right     middle   • FRACTURE SURGERY      left arm   • SHOULDER ARTHROSCOPY W/ ROTATOR CUFF REPAIR Left 2018    Procedure: ARTHROSCOPIC SUBACROMIAL DECOMPRESSION  WITH ROTATOR CUFF REPAIR;  Surgeon: Renard Jacobson MD;  Location: [facility];  Service: Orthopedics                       PT Assessment/Plan     Row Name 19 1000          PT Assessment    Assessment Comments  fatigue w/ OH activities.  able to complete w/ progression as noted in exercises.   (Pended)   -ML        PT Plan    PT Frequency  2x/week  (Pended)   -ML     PT Plan Comments  check strength next visit  (Pended)   -ML       User Key  (r) = Recorded By, (t) = Taken By, (c) = Cosigned By    Initials Name Provider Type    Quincy Kowalski, ATC               Exercises     Row Name 19 1000 19 0900          Subjective Comments    Subjective Comments  --  doing well. feels strength is returning.  no pain c/o's today  (Pended)   -ML         Subjective Pain    Able to rate subjective pain?  --  yes  (Pended)   -ML     Pre-Treatment Pain Level  --  0  (Pended)   -ML        Exercise 1    Exercise Name 1  --  pro2 UE F/R  (Pended)   -ML     Time 1  --  10 min  (Pended)   -ML     Additional Comments  --  L5.0  (Pended)   -ML        Exercise 2    Exercise Name 2  --  Pulley's 3 way  (Pended)   -ML     Time 2  --  3 mins  (Pended)   -ML        Exercise 3    Exercise Name 3     (Pended)   -ML  OH Writing  (Pended)   -ML     Reps 3     (Pended)   -ML  3 min  (Pended)   -ML        Exercise 4    Exercise Name 4  --  Kettle OH press-1 arm  (Pended)   -ML     Sets 4  --  2  (Pended)   -ML     Reps 4  --  10  (Pended)   -ML     Additional Comments  --  2#- posture emphasis  (Pended)   -ML        Exercise 5    Exercise Name 5  --  track walk 1/4 carrying 8lb ball  (Pended)   -ML     Additional Comments  --  left hand  (Pended)   -ML        Exercise 6    Exercise Name 6  --  Shuttle UE press w/ swiss ball  (Pended)   -ML     Sets 6  --  2  (Pended)   -ML     Reps 6  --  15  (Pended)   -ML     Additional Comments  --  3 cords/ yellow swiss ball  (Pended)   -ML        Exercise 7    Exercise Name 7  --  IR S w/ strap  (Pended)   -ML     Reps 7  --  3  (Pended)   -ML     Time 7  --  1 min  (Pended)   -ML        Exercise 8    Exercise Name 8  --  Prone YTI's   (Pended)   -ML     Reps 8  --  20 ea  (Pended)   -ML     Additional Comments  --  1# ea UE  (Pended)   -ML       User Key  (r) = Recorded By, (t) = Taken By, (c) = Cosigned By    Initials Name Provider Type    ML Quincy Thakkar, ATC                          PT OP Goals     Row Name 01/29/19 1000 01/29/19 0900       PT Short Term Goals    STG Date to Achieve  12/20/18  (Pended)   -ML  --    STG 1  Pt indep with HEP for L shoulder ROM/strengthening  (Pended)   -ML  --    STG 1 Progress  Met;Ongoing  (Pended)   -ML  --    STG 2  Improve L shoulder flex AAROM to 110°  (Pended)   -ML  --    STG 2  Progress  Met  (Pended)   -ML  --    STG 3  Improve L shoulder abduction AAROM to 90°  (Pended)   -ML  --    STG 3 Progress  Met  (Pended)   -ML  --    STG 4  Improve L shoulder ER AAROM to 40°  (Pended)   -ML  --    STG 4 Progress  Met  (Pended)   -ML  --    STG 5  Improve L shoulder MMT to 3/5  (Pended)   -ML  --    STG 5 Progress  Met  (Pended)   -ML  --       Long Term Goals    LTG Date to Achieve  01/03/19  (Pended)   -ML  --    LTG 1  Reduce L shoulder pain to 0-1/10 level (at worst) performing ADL's  (Pended)   -ML  --    LTG 1 Progress  Met  (Pended)   -ML  --    LTG 2  Improve L shoulder flex and abduction AROM to 150° and 110°  (Pended)   -ML  --    LTG 2 Progress  Met  (Pended)   -ML  --    LTG 3  Improve L shoulder ER AROM to 60°  (Pended)   -ML  --    LTG 3 Progress  Met  (Pended)   -ML  --    LTG 4  Improve L shoulder MMT to 4-/5  (Pended)   -ML  --    LTG 4 Progress  Ongoing;Progressing  (Pended)   -ML  --    LTG 5  Reduce Quick Dash score to 30 or less  (Pended)   -ML  --    LTG 5 Progress  Progressing  (Pended)   -ML  --       Time Calculation    PT Goal Re-Cert Due Date  --  02/01/19  (Pended)   -ML      User Key  (r) = Recorded By, (t) = Taken By, (c) = Cosigned By    Initials Name Provider Type    Quincy Kowalski ATC                          Time Calculation:   Start Time: (P) 0930  Stop Time: (P) 1042  Time Calculation (min): (P) 72 min  Therapy Suggested Charges     Code   Minutes Charges    None           Therapy Charges for Today     Code Description Service Date Service Provider Modifiers Qty    63803099529 HC PT THER SUPP EA 15 MIN 1/29/2019 Quincy Thakkar, ATC  1    65314968074 HC PT THER PROC EA 15 MIN 1/29/2019 Quincy Thakkar, ATC  4                    Quincy Thakkar ATC  1/29/2019

## 2019-02-01 ENCOUNTER — HOSPITAL ENCOUNTER (OUTPATIENT)
Dept: PHYSICAL THERAPY | Facility: HOSPITAL | Age: 74
Setting detail: THERAPIES SERIES
Discharge: HOME | End: 2019-02-01

## 2019-02-01 DIAGNOSIS — M75.122 COMPLETE TEAR OF LEFT ROTATOR CUFF: Primary | ICD-10-CM

## 2019-02-01 DIAGNOSIS — M25.512 ACUTE PAIN OF LEFT SHOULDER: ICD-10-CM

## 2019-02-01 PROCEDURE — 97110 THERAPEUTIC EXERCISES: CPT

## 2019-02-01 NOTE — THERAPY DISCHARGE NOTE
Outpatient Physical Therapy Ortho Treatment Note/Discharge Summary  [facility]     Patient Name: Tacos Lechuga  : 1945  MRN: [id number]  Today's Date: 2019      Visit Date: 2019  Pt reports 0/10 pain pre treatment, 0/10 pain post treatment  Reports 95% of improvement.  Attended / visits.  Insurance available:24 approved visits   Next MD appt: .  Recertification: D/c.  Visit Dx:    ICD-10-CM ICD-9-CM   1. Complete tear of left rotator cuff M75.122 727.61   2. Acute pain of left shoulder M25.512 719.41       Patient Active Problem List   Diagnosis   • Left shoulder pain   • Complete tear of left rotator cuff        Past Medical History:   Diagnosis Date   • Arthritis    • Elevated cholesterol    • Hypertension    • Left hip pain    • Pain in shoulder     both shoulders   • Wears glasses    • Wears partial dentures         Past Surgical History:   Procedure Laterality Date   • COLONOSCOPY     • FINGER SURGERY Right     middle   • FRACTURE SURGERY      left arm   • SHOULDER ARTHROSCOPY W/ ROTATOR CUFF REPAIR Left 2018    Procedure: ARTHROSCOPIC SUBACROMIAL DECOMPRESSION  WITH ROTATOR CUFF REPAIR;  Surgeon: Renard Jacobson MD;  Location: [facility];  Service: Orthopedics       PT Ortho     Row Name 19 09       Subjective Comments    Subjective Comments  pt denies any pain this date.  -TL       Precautions and Contraindications    Precautions/Limitations  no known precautions/limitations  -TL       Subjective Pain    Able to rate subjective pain?  yes  -TL    Pre-Treatment Pain Level  0  -TL      User Key  (r) = Recorded By, (t) = Taken By, (c) = Cosigned By    Initials Name Provider Type    Estrellita Tam PTA Physical Therapy Assistant                      PT Assessment/Plan     Row Name 19 0900          PT Assessment    Assessment Comments  Pt met all goals. Pt has full ROM. Pt strength 4+/5 left UE. Pt quick dash 27.76. Pt  agree with d/c .  -TL        PT Plan    PT Frequency  2x/week  -TL     PT Plan Comments  d/c  -TL       User Key  (r) = Recorded By, (t) = Taken By, (c) = Cosigned By    Initials Name Provider Type    Estrellita Tam PTA Physical Therapy Assistant          Modalities     Row Name 02/01/19 0900             Ice    Ice Applied  Yes  -TL      Location  left shoulder  -TL      Rx Minutes  15 mins  -TL      Ice S/P Rx  Yes  -TL        User Key  (r) = Recorded By, (t) = Taken By, (c) = Cosigned By    Initials Name Provider Type    TL Estrellita Galvan PTA Physical Therapy Assistant          Exercises     Row Name 02/01/19 0900             Subjective Comments    Subjective Comments  pt denies any pain this date.  -TL         Subjective Pain    Able to rate subjective pain?  yes  -TL      Pre-Treatment Pain Level  0  -TL      Post-Treatment Pain Level  0  -TL         Exercise 1    Exercise Name 1  pro2 UE F/R  -TL      Time 1  10 mins  -TL      Additional Comments  level 5  -TL         Exercise 2    Exercise Name 2  Pulley's 3 way  -TL      Time 2  3 mins  -TL      Additional Comments  #3  -TL         Exercise 3    Exercise Name 3  Quick Dash report  -TL      Additional Comments  27.27  -TL         Exercise 4    Exercise Name 4  track walk 8weight ball  -TL      Additional Comments  1/4min  -TL         Exercise 5    Exercise Name 5  shuttle UE push up Yellow ball  -TL      Sets 5  2  -TL      Reps 5  20  -TL         Exercise 6    Exercise Name 6  Review HEP TB ex shld  -TL      Reps 6  5  -TL      Additional Comments  flex/ext/IR/ER/shld abd/add;high rows mid rows  -TL        User Key  (r) = Recorded By, (t) = Taken By, (c) = Cosigned By    Initials Name Provider Type    Estrellita Tam PTA Physical Therapy Assistant                         PT OP Goals     Row Name 02/01/19 0900          PT Short Term Goals    STG Date to Achieve  12/20/18  -TL     STG 1  Pt indep with HEP for L shoulder ROM/strengthening  -TL      STG 1 Progress  Met  -TL     STG 2  Improve L shoulder flex AAROM to 110°  -TL     STG 2 Progress  Met  -TL     STG 3  Improve L shoulder abduction AAROM to 90°  -TL     STG 3 Progress  Met  -TL     STG 4  Improve L shoulder ER AAROM to 40°  -TL     STG 4 Progress  Met  -TL     STG 5  Improve L shoulder MMT to 3/5  -TL     STG 5 Progress  Met  -TL        Long Term Goals    LTG Date to Achieve  01/03/19  -TL     LTG 1  Reduce L shoulder pain to 0-1/10 level (at worst) performing ADL's  -TL     LTG 1 Progress  Met  -TL     LTG 2  Improve L shoulder flex and abduction AROM to 150° and 110°  -TL     LTG 2 Progress  Met  -TL     LTG 3  Improve L shoulder ER AROM to 60°  -TL     LTG 3 Progress  Met  -TL     LTG 4  Improve L shoulder MMT to 4-/5  -TL     LTG 4 Progress  Met  -TL     LTG 5  Reduce Quick Dash score to 30 or less  -TL     LTG 5 Progress  Met  -TL        Time Calculation    PT Goal Re-Cert Due Date  02/01/19  -TL       User Key  (r) = Recorded By, (t) = Taken By, (c) = Cosigned By    Initials Name Provider Type    Estrellita Tam, PTA Physical Therapy Assistant          Therapy Education  Given: HEP, Symptoms/condition management, Pain management, Posture/body mechanics  Program: Reinforced  How Provided: Verbal  Provided to: Patient  Level of Understanding: Teach back education performed, Verbalized, Demonstrated    Outcome Measure Options: Quick DASH  Quick DASH  Open a tight or new jar.: Mild Difficulty  Do heavy household chores (e.g., wash walls, wash floors): Mild Difficulty  Carry a shopping bag or briefcase: Mild Difficulty  Wash your back: Mild Difficulty  Use a knife to cut food: Mild Difficulty  Recreational activities in which you take some force or impact through your arm, should or hand (e.g. golf, hammering, tennis, etc.): Moderate Difficulty  During the past week, to what extent has your arm, shoulder, or hand problem interfered with your normal social activites with family, friends,  neighbors or groups?: Slightly  During the past week, were you limited in your work or other regular daily activities as a result of your arm, shoulder or hand problem?: Slightly Limited  Arm, Shoulder, or hand pain: Mild  Tingling (pins and needles) in your arm, shoulder, or hand: Mild  During the past week, how much difficulty have you had sleeping because of the pain in your arm, shoulder or hand?: Mild Difficulty  Number of Questions Answered: 11  Quick DASH Score: 27.27         Time Calculation:   Start Time: 0930  Stop Time: 1030  Time Calculation (min): 60 min  PT Non-Billable Time (min): 10 min  Total Timed Code Minutes- PT: 50 minute(s)  Therapy Suggested Charges     Code   Minutes Charges    None           Therapy Charges for Today     Code Description Service Date Service Provider Modifiers Qty    32059854656 HC PT THER PROC EA 15 MIN 2/1/2019 Estrellita Galvan PTA GP 3    98137259756 HC PT THER SUPP EA 15 MIN 2/1/2019 Estrellita Galvan, WENDIE GP 1          PT G-Codes  Outcome Measure Options: Quick DASH  Quick DASH Score: 27.27     OP PT Discharge Summary  Date of Discharge: 02/01/19  Reason for Discharge: All goals achieved  Outcomes Achieved: Able to achieve all goals within established timeline  Discharge Destination: Home with home program  Discharge Instructions/Additional Comments: pt earned a free 30 day membership to [employer] to continue with strengthening.      Estrellita Galvan PTA  2/1/2019

## 2019-02-04 ENCOUNTER — APPOINTMENT (OUTPATIENT)
Dept: PHYSICAL THERAPY | Facility: HOSPITAL | Age: 74
End: 2019-02-04

## 2019-02-05 ENCOUNTER — APPOINTMENT (OUTPATIENT)
Dept: PHYSICAL THERAPY | Facility: HOSPITAL | Age: 74
End: 2019-02-05

## 2019-02-06 ENCOUNTER — APPOINTMENT (OUTPATIENT)
Dept: PHYSICAL THERAPY | Facility: HOSPITAL | Age: 74
End: 2019-02-06

## 2019-02-08 ENCOUNTER — APPOINTMENT (OUTPATIENT)
Dept: PHYSICAL THERAPY | Facility: HOSPITAL | Age: 74
End: 2019-02-08

## 2019-02-11 ENCOUNTER — OFFICE VISIT (OUTPATIENT)
Dept: ORTHOPEDIC SURGERY | Facility: CLINIC | Age: 74
End: 2019-02-11

## 2019-02-11 VITALS — WEIGHT: 211 LBS | HEIGHT: 71 IN | BODY MASS INDEX: 29.54 KG/M2

## 2019-02-11 DIAGNOSIS — M25.512 LEFT SHOULDER PAIN, UNSPECIFIED CHRONICITY: ICD-10-CM

## 2019-02-11 DIAGNOSIS — M75.122 COMPLETE TEAR OF LEFT ROTATOR CUFF: Primary | ICD-10-CM

## 2019-02-11 PROCEDURE — 99212 OFFICE O/P EST SF 10 MIN: CPT | Performed by: ORTHOPAEDIC SURGERY

## 2019-02-11 NOTE — PROGRESS NOTES
"Tacos Lechuga is a 73 y.o. male returns for     Chief Complaint   Patient presents with   • Left Shoulder - Follow-up, Pain       HISTORY OF PRESENT ILLNESS: f/u left shoulder pain, surgery done on 11/8/2018. Patient states pain score today 0/10 was d/c from therapy on 2/1/2019.        CONCURRENT MEDICAL HISTORY:    The following portions of the patient's history were reviewed and updated as appropriate: allergies, current medications, past family history, past medical history, past social history, past surgical history and problem list.     ROS  No fevers or chills.  No chest pain or shortness of air.  No GI or  disturbances.    PHYSICAL EXAMINATION:       Ht 180.3 cm (71\")   Wt 95.7 kg (211 lb)   BMI 29.43 kg/m²     Physical Exam    GAIT:     [x]  Normal  []  Antalgic    Assistive device: [x]  None  []  Walker     []  Crutches  []  Cane     []  Wheelchair  []  Stretcher    Ortho Exam   Excellent motion.  Minimal pain.  Neurologically intact.    No results found.          ASSESSMENT:    Diagnoses and all orders for this visit:    Complete tear of left rotator cuff    Left shoulder pain, unspecified chronicity          PLAN doing very well 3/2 months out from his surgery.  Less pain or discomfort progressing very nicely.  He's been discharged from physical therapy.  I've advised to continue his exercises and let this continue to heal with the next several months.  He'll, at any problems whatsoever we'll see him at that point.    No Follow-up on file.    Renard Jacobson MD    "

## (undated) DEVICE — GOWN,AURORA,NOREINF,RAGLAN,XL,STERILE: Brand: MEDLINE

## (undated) DEVICE — Device

## (undated) DEVICE — GLV SURG SENSICARE POLYISPRN W/ALOE PF LF 6.5 GRN STRL

## (undated) DEVICE — INTENDED FOR TISSUE SEPARATION, AND OTHER PROCEDURES THAT REQUIRE A SHARP SURGICAL BLADE TO PUNCTURE OR CUT.: Brand: BARD-PARKER ® CARBON RIB-BACK BLADES

## (undated) DEVICE — PREP PVP-I 7.5P BT 4OZ

## (undated) DEVICE — GLV SURG TRIUMPH LT PF LTX 6 STRL

## (undated) DEVICE — DRAPE,U/ SHT,SPLIT,PLAS,STERIL: Brand: MEDLINE

## (undated) DEVICE — TP SXN YANKR BLB TIP W/TBG 10F LF STRL

## (undated) DEVICE — PK SHLDR ARTHSCP 60

## (undated) DEVICE — SHEET, DRAPE, SPLIT, STERILE: Brand: MEDLINE

## (undated) DEVICE — SOL IRR LACT RNG BG 3000ML

## (undated) DEVICE — GLV SURG ORTHO BIOGEL OPTIFIT PF SZ9

## (undated) DEVICE — TP NDL SCORPION SUREFIRE SD SLOT

## (undated) DEVICE — NDL HYPO PRECISIONGLIDE/REG 18G 1IN PNK

## (undated) DEVICE — TBG PUMP ARTHSCP MAIN AR6400 16FT

## (undated) DEVICE — SYR LL TP 10ML STRL

## (undated) DEVICE — STERILE POLYISOPRENE POWDER-FREE SURGICAL GLOVES WITH EMOLLIENT COATING: Brand: PROTEXIS

## (undated) DEVICE — SPNG GZ WOVN 4X4IN 12PLY 10/BX STRL

## (undated) DEVICE — BLD SHAVER EXCALIBUR 4MM 13CM

## (undated) DEVICE — STCKNT IMPERV 12IN STRL

## (undated) DEVICE — TBG ARTHSCP DUALWAVE

## (undated) DEVICE — GOWN,PREVENTION PLUS,XLONG/XLARGE,STRL: Brand: MEDLINE

## (undated) DEVICE — SUT ETHLN 4/0 PS2 18IN 1667H

## (undated) DEVICE — TRY IRR

## (undated) DEVICE — PREP SOL POVIDONE/IODINE BT 4OZ

## (undated) DEVICE — APPL CHLORAPREP W/TINT 26ML ORNG

## (undated) DEVICE — STERILE POLYISOPRENE POWDER-FREE SURGICAL GLOVES: Brand: PROTEXIS

## (undated) DEVICE — KT POSTN ARM TRIMANO BEACH CHR W/DRP

## (undated) DEVICE — ABL ASP APOLLO RF XL 90D

## (undated) DEVICE — PAD,ABDOMINAL,8"X10",ST,LF: Brand: MEDLINE